# Patient Record
Sex: FEMALE | Race: WHITE | Employment: OTHER | URBAN - METROPOLITAN AREA
[De-identification: names, ages, dates, MRNs, and addresses within clinical notes are randomized per-mention and may not be internally consistent; named-entity substitution may affect disease eponyms.]

---

## 2017-03-29 PROBLEM — M17.9 OSTEOARTHRITIS OF KNEE: Status: ACTIVE | Noted: 2017-03-29

## 2017-03-29 PROBLEM — M06.9 RA (RHEUMATOID ARTHRITIS) (HCC): Status: ACTIVE | Noted: 2017-03-29

## 2017-03-29 PROBLEM — R25.2 SPASM: Status: ACTIVE | Noted: 2017-03-29

## 2017-03-29 PROBLEM — M25.539 PAIN IN WRIST: Status: ACTIVE | Noted: 2017-03-29

## 2017-12-05 PROBLEM — E66.01 OBESITY, MORBID (HCC): Status: ACTIVE | Noted: 2017-12-05

## 2020-09-29 ENCOUNTER — HOSPITAL ENCOUNTER (OUTPATIENT)
Dept: GENERAL RADIOLOGY | Age: 65
Discharge: HOME | End: 2020-09-29
Payer: COMMERCIAL

## 2020-09-29 DIAGNOSIS — G89.29 CHRONIC LEFT SHOULDER PAIN: ICD-10-CM

## 2020-09-29 DIAGNOSIS — M25.512 CHRONIC LEFT SHOULDER PAIN: ICD-10-CM

## 2020-09-29 PROCEDURE — 72050 X-RAY EXAM NECK SPINE 4/5VWS: CPT

## 2020-09-29 PROCEDURE — 73030 X-RAY EXAM OF SHOULDER: CPT

## 2022-03-18 PROBLEM — R25.2 SPASM: Status: ACTIVE | Noted: 2017-03-29

## 2022-03-19 PROBLEM — E66.01 OBESITY, MORBID (HCC): Status: ACTIVE | Noted: 2017-12-05

## 2022-03-19 PROBLEM — M17.9 OSTEOARTHRITIS OF KNEE: Status: ACTIVE | Noted: 2017-03-29

## 2022-03-19 PROBLEM — M06.9 RA (RHEUMATOID ARTHRITIS) (HCC): Status: ACTIVE | Noted: 2017-03-29

## 2022-03-19 PROBLEM — M25.539 PAIN IN WRIST: Status: ACTIVE | Noted: 2017-03-29

## 2022-05-24 ENCOUNTER — OFFICE VISIT (OUTPATIENT)
Dept: RHEUMATOLOGY | Age: 67
End: 2022-05-24
Payer: COMMERCIAL

## 2022-05-24 VITALS
WEIGHT: 217 LBS | SYSTOLIC BLOOD PRESSURE: 137 MMHG | HEIGHT: 61 IN | BODY MASS INDEX: 40.97 KG/M2 | HEART RATE: 79 BPM | DIASTOLIC BLOOD PRESSURE: 79 MMHG

## 2022-05-24 DIAGNOSIS — M06.09 RHEUMATOID ARTHRITIS, SERONEGATIVE, MULTIPLE SITES (HCC): Primary | ICD-10-CM

## 2022-05-24 DIAGNOSIS — Z79.899 LONG-TERM USE OF HIGH-RISK MEDICATION: ICD-10-CM

## 2022-05-24 LAB
ALBUMIN SERPL-MCNC: 4.2 G/DL (ref 3.2–4.6)
ALBUMIN/GLOB SERPL: 1.3 {RATIO} (ref 1.2–3.5)
ALP SERPL-CCNC: 76 U/L (ref 50–136)
ALT SERPL-CCNC: 31 U/L (ref 12–65)
ANION GAP SERPL CALC-SCNC: 5 MMOL/L (ref 7–16)
AST SERPL-CCNC: 14 U/L (ref 15–37)
BASOPHILS # BLD: 0.1 K/UL (ref 0–0.2)
BASOPHILS NFR BLD: 1 % (ref 0–2)
BILIRUB SERPL-MCNC: 0.3 MG/DL (ref 0.2–1.1)
BUN SERPL-MCNC: 14 MG/DL (ref 8–23)
CALCIUM SERPL-MCNC: 9.8 MG/DL (ref 8.3–10.4)
CHLORIDE SERPL-SCNC: 105 MMOL/L (ref 98–107)
CO2 SERPL-SCNC: 29 MMOL/L (ref 21–32)
CREAT SERPL-MCNC: 0.6 MG/DL (ref 0.6–1)
CRP SERPL-MCNC: <0.3 MG/DL (ref 0–0.9)
DIFFERENTIAL METHOD BLD: ABNORMAL
EOSINOPHIL # BLD: 0.6 K/UL (ref 0–0.8)
EOSINOPHIL NFR BLD: 10 % (ref 0.5–7.8)
ERYTHROCYTE [DISTWIDTH] IN BLOOD BY AUTOMATED COUNT: 13 % (ref 11.9–14.6)
GLOBULIN SER CALC-MCNC: 3.2 G/DL (ref 2.3–3.5)
GLUCOSE SERPL-MCNC: 97 MG/DL (ref 65–100)
HCT VFR BLD AUTO: 38.8 % (ref 35.8–46.3)
HGB BLD-MCNC: 12.5 G/DL (ref 11.7–15.4)
IMM GRANULOCYTES # BLD AUTO: 0 K/UL (ref 0–0.5)
IMM GRANULOCYTES NFR BLD AUTO: 0 % (ref 0–5)
LYMPHOCYTES # BLD: 1.8 K/UL (ref 0.5–4.6)
LYMPHOCYTES NFR BLD: 30 % (ref 13–44)
MCH RBC QN AUTO: 30 PG (ref 26.1–32.9)
MCHC RBC AUTO-ENTMCNC: 32.2 G/DL (ref 31.4–35)
MCV RBC AUTO: 93.3 FL (ref 79.6–97.8)
MONOCYTES # BLD: 0.7 K/UL (ref 0.1–1.3)
MONOCYTES NFR BLD: 12 % (ref 4–12)
NEUTS SEG # BLD: 2.8 K/UL (ref 1.7–8.2)
NEUTS SEG NFR BLD: 47 % (ref 43–78)
NRBC # BLD: 0 K/UL (ref 0–0.2)
PLATELET # BLD AUTO: 225 K/UL (ref 150–450)
PMV BLD AUTO: 10.4 FL (ref 9.4–12.3)
POTASSIUM SERPL-SCNC: 4 MMOL/L (ref 3.5–5.1)
PROT SERPL-MCNC: 7.4 G/DL (ref 6.3–8.2)
RBC # BLD AUTO: 4.16 M/UL (ref 4.05–5.2)
SODIUM SERPL-SCNC: 139 MMOL/L (ref 136–145)
WBC # BLD AUTO: 6 K/UL (ref 4.3–11.1)

## 2022-05-24 PROCEDURE — 99214 OFFICE O/P EST MOD 30 MIN: CPT | Performed by: INTERNAL MEDICINE

## 2022-05-24 PROCEDURE — 1123F ACP DISCUSS/DSCN MKR DOCD: CPT | Performed by: INTERNAL MEDICINE

## 2022-05-24 RX ORDER — ACETAMINOPHEN 500 MG
500 TABLET ORAL EVERY 6 HOURS PRN
COMMUNITY

## 2022-05-24 ASSESSMENT — PATIENT HEALTH QUESTIONNAIRE - PHQ9
1. LITTLE INTEREST OR PLEASURE IN DOING THINGS: 0
SUM OF ALL RESPONSES TO PHQ QUESTIONS 1-9: 0
2. FEELING DOWN, DEPRESSED OR HOPELESS: 0
SUM OF ALL RESPONSES TO PHQ QUESTIONS 1-9: 0
SUM OF ALL RESPONSES TO PHQ9 QUESTIONS 1 & 2: 0

## 2022-05-24 ASSESSMENT — JOINT PAIN
TOTAL NUMBER OF TENDER JOINTS: 12
TOTAL NUMBER OF SWOLLEN JOINTS: 2

## 2022-05-24 NOTE — PROGRESS NOTES
Mallorie Wall M.D.  [address] [place] [address], [address] [facility]  Office : [phone], Fax: [phone] OFFICE VISIT NOTE  Date of Visit:  2022 2:16 PM    Patient Information:  Name:  Raul Wagoner  :  1955  Age:  79 y.o. Gender:  female      Ms. Dedra Whitlock is here today for follow-up of RA. Last visit: 22      History of Present Illness: On talking to the patient she states that she has not had a cough with no congestion, fever or chills since she takes Chlortrimeton as needed to help with her allergies. She is scheduled to have a right total knee joint replacement surgery by Dr. Diana Alejandro on . Her other current joint complaints are as mentioned below. Since the last visit, patient is feeling \"very good \". Pain: 5/10  Location:  Bilateral knee pain worse in the right knee than the left knee. Some right knee swelling and warmth but no overlying redness. No buckling of the right knee. Bilateral hand stiffness involving the PIP joints with swelling with no overlying warmth and redness. Bilateral thumb pain worse in the left thumb than the right thumb involving the IP joint only in the left thumb and the CMC and IP of the right thumb. She does complain of some neck stiffness with occasional pain with neck ROM. She denies any headaches. Quality: Sharp pain in the knees and left thumb and achy pain in the hands. Modifying Factors:  End of the day the pain and stiffness is the worst.   Associated Symptoms:  Has a good  with no difficulty opening jars with no difficulty buttoning and unbuttoning. No tingling, numbness or pain down the arms or legs.     Last TB screen: 21  TB result: Negative     Current dose of steroids: NA  How long on current dose of steroids: NA  How long on continuous steroid therapy: NA      Past DMARDs, if applicable (methotrexate, plaquenil/hydroxychloroquine, sulfasalazine, Arava/leflunomide): Was on Plaquenil and methotrexate weekly in the past.      Past biologics, if applicable (enbrel, humira, simponi, cimzia, xeljanz, orencia, remicade, simponi aria, actemra, rituximab, Stann Yip, stelara, cosentyx): Currently on Humira 40 mg subcu 1 shot administered to self every 2 weeks (every other Thursday).        Past NSAIDs, if applicable (motrin, aleve, naproxen, advil, ibuprofen, celebrex, voltaren/diclofenac, etc.): Motrin/Ibuprofen, Advil, Mobic, Relafen in the past. Taking Aleve 200mg prn.       Last BMD: 2018  Past osteoporosis drugs, if applicable (fosamax, actonel, boniva, reclast, prolia, forteo): None    BMI: 41.00  Current exercise regimen, if any: none  Current vitamin D dose: D3 5,000 iu qd  Current calcium dose: None  Fractures since last visit, if any: None    The patient otherwise has no significant interval changes in health or medical history to report.      History Reviewed:    Past Medical History  Past Medical History:   Diagnosis Date    Knee pain     Osteoarthritis of knee 3/29/2017    Pain in wrist 3/29/2017    RA (rheumatoid arthritis) ([facility] [address] 3/29/2017    Spasm 3/29/2017       Past Surgical History  Past Surgical History:   Procedure Laterality Date    CHOLECYSTECTOMY      COLONOSCOPY      2017    ORTHOPEDIC SURGERY  2011    Knee surgery    OTHER SURGICAL HISTORY Right     Thumb    TONSILLECTOMY  1973       Family History  Family History   Problem Relation Age of Onset    Hypertension Brother     Heart Disease Father     Heart Disease Mother        Social History  Social History     Socioeconomic History    Marital status: Single     Spouse name: None    Number of children: None    Years of education: None    Highest education level: None   Occupational History    None   Tobacco Use    Smoking status: Former Smoker     Packs/day: 0.50     Quit date: 1985     Years since quittin.4    Smokeless tobacco: Never Used   Substance and Sexual Activity    Alcohol use: No    Drug use: Not Currently     Types: Prescription, Opiates     Sexual activity: None   Other Topics Concern    None   Social History Narrative    None     Social Determinants of Health     Financial Resource Strain:     Difficulty of Paying Living Expenses: Not on file   Food Insecurity:     Worried About Running Out of Food in the Last Year: Not on file    Renato of Food in the Last Year: Not on file   Transportation Needs:     Lack of Transportation (Medical): Not on file    Lack of Transportation (Non-Medical):  Not on file   Physical Activity:     Days of Exercise per Week: Not on file    Minutes of Exercise per Session: Not on file   Stress:     Feeling of Stress : Not on file   Social Connections:     Frequency of Communication with Friends and Family: Not on file    Frequency of Social Gatherings with Friends and Family: Not on file    Attends Buddhism Services: Not on file    Active Member of [address] [place] [address] or Organizations: Not on file    Attends Club or Organization Meetings: Not on file    Marital Status: Not on file   Intimate Partner Violence:     Fear of Current or Ex-Partner: Not on file    Emotionally Abused: Not on file    Physically Abused: Not on file    Sexually Abused: Not on file   Housing Stability:     Unable to Pay for Housing in the Last Year: Not on file    Number of [place] in the Last Year: Not on file    Unstable Housing in the Last Year: Not on file               Allergy:  Allergies   Allergen Reactions    Penicillins Shortness Of Breath    Hydroxyzine Hcl Rash    Sulfa Antibiotics Rash         Current Medications:  Outpatient Encounter Medications as of 5/24/2022   Medication Sig Dispense Refill    acetaminophen (TYLENOL) 500 MG tablet Take 500 mg by mouth every 6 hours as needed for Pain 1-2 times daily prn      adalimumab (HUMIRA PEN) 40 MG/0.8ML injection Inject 40 mg into the skin every 14 days INJECT 40 MG (0.8 ML) UNDER THE SKIN EVERY 2 WEEKS 6 each 0    vitamin D3 (CHOLECALCIFEROL) 125 MCG (5000 UT) TABS tablet Take by mouth daily      cyclobenzaprine (FLEXERIL) 10 MG tablet Take 10 mg by mouth 3 times daily as needed      folic acid (FOLVITE) 1 MG tablet Take 1 mg by mouth daily      furosemide (LASIX) 40 MG tablet Take by mouth daily      levothyroxine (SYNTHROID) 200 MCG tablet Take by mouth every morning (before breakfast)      Naproxen Sodium 220 MG CAPS Take by mouth      telmisartan (MICARDIS) 80 MG tablet Take 80 mg by mouth daily      predniSONE (DELTASONE) 20 MG tablet Take 1 pill once a day after breakfast for 1 week and then 1/2 a pill once a day after breakfast for 1 week and then stop. (Patient not taking: Reported on 5/24/2022)      [DISCONTINUED] adalimumab (HUMIRA PEN) 40 MG/0.8ML injection INJECT 40 MG (0.8 ML) UNDER THE SKIN EVERY 2 WEEKS       No facility-administered encounter medications on file as of 5/24/2022.            REVIEW OF SYSTEMS: The following systems were reviewed with patient today and were negative except for the following (depicted with an \"X\"):        \"X\" General  \"X\" Head and Neck  \"X\" Heart and Breathing  \"X\" Gastrointestinal    Fever/chills  x Hair loss   Shortness of breath   Upset stomach    Falls   Dry mouth   Coughing   Diarrhea / constipation    Wt loss   Mouth sores   Wheezing   Heartburn    Wt gain   Ringing ears   Chest pain   Dark or bloody stools    Night sweats   Diff. swallowing  X None of above   Nausea or vomiting   X None of above   None of above     X None of above                \"X\" Skin  \"X\" Neurology  \"X\" Urinary/Gyn  \"X\" Other   x Easy bruising   Numbness/ tingling   Female problems   Depression    Rashes   Weakness   Problems with urination   Feeling anxious    Sun sensitivity   Headaches  X None of above   Problems sleeping    None of above  X None of above     X None of above          Physical Exam:  Blood pressure 137/79, pulse 79, height 5' 1\" (1.549 m), weight 217 lb (98.4 kg). General:  Patient alert, cooperative and in no apparent distress. HEENT: Pupils equally reactive to light and accommodation, minimal scleral injection noted. Heart: Regular rate and rhythm, normal S1 and S2, no rubs or gallops. Lungs: Clear to auscultation bilaterally. Abdomen: Soft, nontender, no hepatosplenomegaly. Skin:  Noted rash on bilateral lower extremities involving the area of the shin. No nail abnormalities. Neurologic:  Oriented, normal speech and affect. Normal gait. Extremities:  No edema in bilateral lower extremities with no cyanosis or clubbing. Muskoskeletal Exam:     I examined the shoulders, elbows, wrists, MCPs, PIPs, DIPs and knees bilaterally for strength, range of motion, deformity, tenderness, swelling, and synovitis. The findings are:         Physical Exam              Joint Exam 05/24/2022        Right  Left   IP   Tender   Tender   PIP 2   Tender   Tender   PIP 3   Tender   Tender   PIP 4   Tender   Tender   PIP 5   Tender   Tender   Knee  Swollen Tender  Swollen Tender       cJADAS 10: 3.62. Patient otherwise has a normal joint exam without other evidence of joint tenderness, synovitis, warmth, erythema, decreased ROM, weakness or deformities. Radiology Reports Reviewed (if available):  Last 3 months  [unfilled]    Lab Reports Reviewed (if available): Last 3 months    No visits with results within 3 Month(s) from this visit.    Latest known visit with results is:   Office Visit on 08/23/2021   Component Date Value Ref Range Status    CRP 08/23/2021 1  0 - 10 mg/L Final    Glucose 08/23/2021 104* 65 - 99 mg/dL Final    BUN 08/23/2021 12  8 - 27 mg/dL Final    CREATININE 08/23/2021 0.51* 0.57 - 1.00 mg/dL Final    EGFR IF NonAfrican American 08/23/2021 101  >59 mL/min/1.73 Final    GFR  08/23/2021 116  >59 mL/min/1.73 Final    Comment: **[facility] currently reports eGFR in compliance with the current**    recommendations of the [employer]. Brian Hogue will    update reporting as new guidelines are published from the NKF-ASN    Task force.       Bun/Cre Ratio 08/23/2021 24  12 - 28 NA Final    Sodium 08/23/2021 141  134 - 144 mmol/L Final    Potassium 08/23/2021 3.8  3.5 - 5.2 mmol/L Final    Chloride 08/23/2021 103  96 - 106 mmol/L Final    CO2 08/23/2021 26  20 - 29 mmol/L Final    Calcium 08/23/2021 9.9  8.7 - 10.3 mg/dL Final    Total Protein 08/23/2021 6.9  6.0 - 8.5 g/dL Final    Albumin 08/23/2021 4.3  3.8 - 4.8 g/dL Final    Globulin, Total 08/23/2021 2.6  1.5 - 4.5 g/dL Final    Albumin/Globulin Ratio 08/23/2021 1.7  1.2 - 2.2 NA Final    Total Bilirubin 08/23/2021 0.3  0.0 - 1.2 mg/dL Final    Alkaline Phosphatase 08/23/2021 75  48 - 121 IU/L Final    AST 08/23/2021 13  0 - 40 IU/L Final    ALT 08/23/2021 13  0 - 32 IU/L Final    WBC 08/23/2021 7.8  3.4 - 10.8 x10E3/uL Final    RBC 08/23/2021 4.31  3.77 - 5.28 x10E6/uL Final    Hemoglobin 08/23/2021 13.2  11.1 - 15.9 g/dL Final    Hematocrit 08/23/2021 38.8  34.0 - 46.6 % Final    MCV 08/23/2021 90  79 - 97 fL Final    MCH 08/23/2021 30.6  26.6 - 33.0 pg Final    MCHC 08/23/2021 34.0  31.5 - 35.7 g/dL Final    RDW 08/23/2021 11.8  11.7 - 15.4 % Final    Platelets 66/20/0771 235  150 - 450 x10E3/uL Final    Neutrophils % 08/23/2021 54  Not Estab. % Final    Lymphocytes % 08/23/2021 29  Not Estab. % Final    Monocytes % 08/23/2021 7  Not Estab. % Final    Eosinophils % 08/23/2021 9  Not Estab. % Final    Basophils % 08/23/2021 1  Not Estab. % Final    Neutrophils Absolute 08/23/2021 4.2  1.4 - 7.0 x10E3/uL Final    Lymphocytes Absolute 08/23/2021 2.3  0.7 - 3.1 x10E3/uL Final    Monocytes Absolute 08/23/2021 0.5  0.1 - 0.9 x10E3/uL Final    Eosinophils Absolute 08/23/2021 0.7* 0.0 - 0.4 x10E3/uL Final    Basophils Absolute 08/23/2021 0.1  0.0 - 0.2 x10E3/uL Final    Immature Granulocytes 08/23/2021 0  Not Estab. % Final    GRANULOCYTE ABSOLUTE COUNT 08/23/2021 0.0  0.0 - 0.1 x10E3/uL Final         The results above were reviewed and discussed with patient. Assessment/Plan: Vielka Church is a 79 y.o. female who presents with:     1. Rheumatoid arthritis, seronegative, multiple sites [facility]): She was instructed to continue Humira 40 mg 1 shot to be administered to self every 2 weeks. Patient is aware that if she is sick requiring her to an antibiotic or antiviral drug she will need to skip administering the Humira until she has completed the antibiotic/antiviral course and is over the infection. -     adalimumab (HUMIRA PEN) 40 MG/0.8ML injection; Inject 40 mg into the skin every 14 days INJECT 40 MG (0.8 ML) UNDER THE SKIN EVERY 2 WEEKS  -     C-Reactive Protein; Future  -     C-Reactive Protein    2. Long-term use of high-risk medication: If there is any noted abnormality I will keep the patient informed but if not I will review her labs with her on follow-up. -     Comprehensive Metabolic Panel; Future  -     CBC with Auto Differential; Future    Disease activity plan:  As stated above. Steroid management plan:  As stated above, if applicable. Pain management plan:  As stated above, if applicable. Weight management plan:  Weight loss through diet and exercise is always encouraged    Disease prognosis: Good        I appreciate the opportunity to continue to participate in the care of this patient. Follow-up and Dispositions    · Return in about 4 months (around 9/24/2022). Electronically signed by:  Criselda Wright MD      This note was dictated using dragon voice recognition software.   It has been proofread, but there may still exist voice recognition errors that the author did not detect.                --------------------------------------------------------------------------------------------------------------------------------------------------------------------------------------------------------------------------------

## 2022-09-22 ENCOUNTER — OFFICE VISIT (OUTPATIENT)
Dept: RHEUMATOLOGY | Age: 67
End: 2022-09-22
Payer: COMMERCIAL

## 2022-09-22 VITALS — WEIGHT: 216 LBS | BODY MASS INDEX: 40.78 KG/M2 | HEIGHT: 61 IN

## 2022-09-22 DIAGNOSIS — M62.838 MUSCLE SPASM: ICD-10-CM

## 2022-09-22 DIAGNOSIS — M06.09 RHEUMATOID ARTHRITIS, SERONEGATIVE, MULTIPLE SITES (HCC): Primary | ICD-10-CM

## 2022-09-22 DIAGNOSIS — Z23 NEED FOR INFLUENZA VACCINATION: ICD-10-CM

## 2022-09-22 PROCEDURE — 1123F ACP DISCUSS/DSCN MKR DOCD: CPT | Performed by: INTERNAL MEDICINE

## 2022-09-22 PROCEDURE — 90471 IMMUNIZATION ADMIN: CPT | Performed by: INTERNAL MEDICINE

## 2022-09-22 PROCEDURE — 90694 VACC AIIV4 NO PRSRV 0.5ML IM: CPT | Performed by: INTERNAL MEDICINE

## 2022-09-22 PROCEDURE — 99214 OFFICE O/P EST MOD 30 MIN: CPT | Performed by: INTERNAL MEDICINE

## 2022-09-22 RX ORDER — CYCLOBENZAPRINE HCL 10 MG
TABLET ORAL
Qty: 270 TABLET | Refills: 1 | Status: SHIPPED | OUTPATIENT
Start: 2022-09-22

## 2022-09-22 ASSESSMENT — JOINT PAIN
TOTAL NUMBER OF SWOLLEN JOINTS: 2
TOTAL NUMBER OF TENDER JOINTS: 4

## 2022-09-22 ASSESSMENT — ROUTINE ASSESSMENT OF PATIENT INDEX DATA (RAPID3)
ON A SCALE OF ONE TO TEN, CONSIDERING ALL THE WAYS IN WHICH ILLNESS AND HEALTH CONDITIONS MAY AFFECT YOU AT THIS TIME, PLEASE INDICATE BELOW HOW YOU ARE DOING:: 1
ON A SCALE OF ONE TO TEN, HOW MUCH PAIN HAVE YOU HAD BECAUSE OF YOUR CONDITION OVER THE PAST WEEK?: 7
ON A SCALE OF ONE TO TEN, HOW MUCH OF A PROBLEM HAS UNUSUAL FATIGUE OR TIREDNESS BEEN FOR YOU OVER THE PAST WEEK?: 4
WHEN YOU AWAKENED IN THE MORNING OVER THE LAST WEEK, PLEASE INDICATE THE AMOUNT OF TIME IT TAKES UNTIL YOU ARE AS LIMBER AS YOU WILL BE FOR THE DAY: > 1 HOUR
ON A SCALE OF ONE TO TEN, HOW DIFFICULT WAS IT FOR YOU TO COMPLETE THE LISTED DAILY PHYSICAL TASKS OVER THE LAST WEEK: 0.7

## 2022-09-22 NOTE — PROGRESS NOTES
Andre Menon M.D.  [address] [place] [address], [address] [facility]   Office : [phone], Fax: [phone] OFFICE VISIT NOTE  Date of Visit:  2022 6:42 PM    Patient Information:  Name:  Vero Fernandes  :  1955  Age:  79 y.o. Gender:  female      Ms. Peter Julian is here today for follow-up of RA. Last visit: 22      History of Present Illness: On talking to the patient today she states that she has received the flu vaccine for this year and hence the flu vaccine was administered to the patient today. She is supposed to have pre-op labs done this month as she is scheduled to have a total right knee joint replacement on . Her other current joint complaints are as mentioned below. Since the last visit, patient is feeling \"good\". Pain: 7/10  Location:  Some right knee pain with swelling worse than the left knee pain and swelling. Occasional buckling of the right knee worse than the left knee. Bilateral thumb pain with swelling but no warmth and redness. Some stiffness in the PIP joints. Quality:  Deep achy to sharp pain. Modifying Factors: The patellar strap helps. Associated Symptoms:  No tingling, numbness or pain down the arms or legs. No UE or LE weakness. Has a weak  with no limitations with her ADL's. DMARD/Biologic 2022   AM Stiffness > 1 hour   Pain 7   Fatigue 4   MDHAQ 0.7   Patient Global Score 1   Medication Name Humira   Medication Name Other   Other Medication flexeril     Last TB screen: 21- having TB skin test done for surgery in the   next 2 weeks  TB result: Testing done 2021 was negative     Current dose of steroids: NA  How long on current dose of steroids: NA  How long on continuous steroid therapy: NA      Past DMARDs, if applicable (methotrexate, plaquenil/hydroxychloroquine, sulfasalazine, Arava/leflunomide):  Was on Plaquenil 200 mg twice a day and Methotrexate 7.5 mg weekly in Allergy:  Allergies   Allergen Reactions    Penicillins Shortness Of Breath    Hydroxyzine Hcl Rash    Sulfa Antibiotics Rash         Current Medications:  Outpatient Encounter Medications as of 9/22/2022   Medication Sig Dispense Refill    adalimumab (HUMIRA PEN) 40 MG/0.8ML injection Inject 40 mg into the skin every 14 days INJECT 40 MG (0.8 ML) UNDER THE SKIN EVERY 2 WEEKS 6 each 1    cyclobenzaprine (FLEXERIL) 10 MG tablet Take 1 pill three times a day as needed. 270 tablet 1    acetaminophen (TYLENOL) 500 MG tablet Take 500 mg by mouth every 6 hours as needed for Pain 1-2 times daily prn      vitamin D3 (CHOLECALCIFEROL) 125 MCG (5000 UT) TABS tablet Take by mouth daily      folic acid (FOLVITE) 1 MG tablet Take 1 mg by mouth daily      furosemide (LASIX) 40 MG tablet Take by mouth daily      levothyroxine (SYNTHROID) 200 MCG tablet Take by mouth every morning (before breakfast)      Naproxen Sodium 220 MG CAPS Take by mouth      predniSONE (DELTASONE) 20 MG tablet Take 1 pill once a day after breakfast for 1 week and then 1/2 a pill once a day after breakfast for 1 week and then stop.      telmisartan (MICARDIS) 80 MG tablet Take 80 mg by mouth daily      [DISCONTINUED] adalimumab (HUMIRA PEN) 40 MG/0.8ML injection Inject 40 mg into the skin every 14 days INJECT 40 MG (0.8 ML) UNDER THE SKIN EVERY 2 WEEKS 6 each 0    [DISCONTINUED] cyclobenzaprine (FLEXERIL) 10 MG tablet Take 10 mg by mouth 3 times daily as needed       No facility-administered encounter medications on file as of 9/22/2022.            REVIEW OF SYSTEMS: The following systems were reviewed with patient today and were negative except for the following (depicted with an \"X\"):        \"X\" General  \"X\" Head and Neck  \"X\" Heart and Breathing  \"X\" Gastrointestinal    Fever/chills  x Hair loss   Shortness of breath   Upset stomach    Falls   Dry mouth   Coughing   Diarrhea / constipation    Wt loss   Mouth sores   Wheezing   Heartburn Wt gain   Ringing ears   Chest pain   Dark or bloody stools    Night sweats   Diff. swallowing  X None of above   Nausea or vomiting   X None of above   None of above     X None of above                \"X\" Skin  \"X\" Neurology  \"X\" Urinary/Gyn  \"X\" Other    Easy bruising   Numbness/ tingling   Female problems   Depression    Rashes   Weakness   Problems with urination   Feeling anxious    Sun sensitivity   Headaches  X None of above   Problems sleeping   X None of above  X None of above     X None of above          Physical Exam:  Height 5' 1\" (1.549 m), weight 216 lb (98 kg). General:  Patient alert, cooperative and in no apparent distress. HEENT: Pupils equally reactive to light and accomodation, no scleral injection noted. Heart: Regular rate and rhythm, normal S1 and S2, audible murmur with no rubs or gallops. Lungs: Clear to auscultation bilaterally with no audible wheeze or rhonchi. Abdomen: Soft, nontender, no hepatosplenomegaly. Skin:  No rashes. No nail abnormalities. Neurologic:  Oriented, normal speech and affect. Normal gait. Extremities:  No edema in bilateral lower extremities with no cyanosis or clubbing. Muskoskeletal Exam:     I examined the shoulders, elbows, wrists, MCPs, PIPs, DIPs and knees bilaterally for strength, range of motion, deformity, tenderness, swelling, and synovitis. The findings are:       Physical Exam              Joint Exam 09/22/2022        Right  Left   MCP 1   Tender   Tender   Knee  Swollen Tender  Swollen Tender     cJADAS 10:- 2.67    Patient otherwise has a normal joint exam without other evidence of joint tenderness, synovitis, warmth, erythema, decreased ROM, weakness or deformities. Radiology Reports Reviewed (if available):  Last 3 months  [unfilled]    Lab Reports Reviewed (if available): Last 3 months    No visits with results within 3 Month(s) from this visit.    Latest known visit with results is:   Office Visit on 05/24/2022   Component Date Value Ref Range Status    WBC 05/24/2022 6.0  4.3 - 11.1 K/uL Final    RBC 05/24/2022 4.16  4.05 - 5.2 M/uL Final    Hemoglobin 05/24/2022 12.5  11.7 - 15.4 g/dL Final    Hematocrit 05/24/2022 38.8  35.8 - 46.3 % Final    MCV 05/24/2022 93.3  79.6 - 97.8 FL Final    MCH 05/24/2022 30.0  26.1 - 32.9 PG Final    MCHC 05/24/2022 32.2  31.4 - 35.0 g/dL Final    RDW 05/24/2022 13.0  11.9 - 14.6 % Final    Platelets 00/06/4820 225  150 - 450 K/uL Final    MPV 05/24/2022 10.4  9.4 - 12.3 FL Final    nRBC 05/24/2022 0.00  0.0 - 0.2 K/uL Final    **Note: Absolute NRBC parameter is now reported with Hemogram**    Differential Type 05/24/2022 AUTOMATED    Final    Seg Neutrophils 05/24/2022 47  43 - 78 % Final    Lymphocytes 05/24/2022 30  13 - 44 % Final    Monocytes 05/24/2022 12  4.0 - 12.0 % Final    Eosinophils % 05/24/2022 10 (A) 0.5 - 7.8 % Final    Basophils 05/24/2022 1  0.0 - 2.0 % Final    Immature Granulocytes 05/24/2022 0  0.0 - 5.0 % Final    Segs Absolute 05/24/2022 2.8  1.7 - 8.2 K/UL Final    Absolute Lymph # 05/24/2022 1.8  0.5 - 4.6 K/UL Final    Absolute Mono # 05/24/2022 0.7  0.1 - 1.3 K/UL Final    Absolute Eos # 05/24/2022 0.6  0.0 - 0.8 K/UL Final    Basophils Absolute 05/24/2022 0.1  0.0 - 0.2 K/UL Final    Absolute Immature Granulocyte 05/24/2022 0.0  0.0 - 0.5 K/UL Final    CRP 05/24/2022 <0.3  0.0 - 0.9 mg/dL Final    Sodium 05/24/2022 139  136 - 145 mmol/L Final    Potassium 05/24/2022 4.0  3.5 - 5.1 mmol/L Final    Chloride 05/24/2022 105  98 - 107 mmol/L Final    CO2 05/24/2022 29  21 - 32 mmol/L Final    Anion Gap 05/24/2022 5 (A) 7 - 16 mmol/L Final    Glucose 05/24/2022 97  65 - 100 mg/dL Final    BUN 05/24/2022 14  8 - 23 MG/DL Final    Creatinine 05/24/2022 0.60  0.6 - 1.0 MG/DL Final    GFR  05/24/2022 >60  >60 ml/min/1.73m2 Final    GFR Non- 05/24/2022 >60  >60 ml/min/1.73m2 Final    Comment:   Estimated GFR is calculated using the Modification of Diet in Renal Disease (MDRD) Study equation, reported for both  Americans (GFRAA) and non- Americans (GFRNA), and normalized to 1.73m2 body surface area. The physician must decide which value applies to the patient. The MDRD study equation should only be used in individuals age 25 or older. It has not been validated for the following: pregnant women, patients with serious comorbid conditions,or on certain medications, or persons with extremes of body size, muscle mass, or nutritional status. Calcium 05/24/2022 9.8  8.3 - 10.4 MG/DL Final    Total Bilirubin 05/24/2022 0.3  0.2 - 1.1 MG/DL Final    ALT 05/24/2022 31  12 - 65 U/L Final    AST 05/24/2022 14 (A) 15 - 37 U/L Final    Alk Phosphatase 05/24/2022 76  50 - 136 U/L Final    Total Protein 05/24/2022 7.4  6.3 - 8.2 g/dL Final    Albumin 05/24/2022 4.2  3.2 - 4.6 g/dL Final    Globulin 05/24/2022 3.2  2.3 - 3.5 g/dL Final    Albumin/Globulin Ratio 05/24/2022 1.3  1.2 - 3.5   Final         The results above were reviewed and discussed with patient. Assessment/Plan: Bret Hawkins is a 79 y.o. female who presents with:     Rheumatoid arthritis, seronegative, multiple sites [facility]): Patient was instructed to continue Humira 40 mg 1 shot to be administered to self every 2 weeks. She is aware that if she is sick requiring her to be on an antibiotic or antiviral drug she will need to skip administering the Humira until she has completed the antibiotic/antiviral course and is over the infection. -     adalimumab (HUMIRA PEN) 40 MG/0.8ML injection; Inject 40 mg into the skin every 14 days INJECT 40 MG (0.8 ML) UNDER THE SKIN EVERY 2 WEEKS    Muscle spasm: Patient was instructed to continue Flexeril 10 mg 1 pill to be taken 3 times a day as needed for muscle spasms. -     cyclobenzaprine (FLEXERIL) 10 MG tablet; Take 1 pill three times a day as needed. Need for influenza vaccination:  The flu vaccine was administered to the patient today by my MA. She did tolerate the vaccine with no adverse side effects. -     Influenza, FLUAD, (age 72 y+), IM, PF, 0.5 mL     Disease activity plan:  As stated above. Steroid management plan:  As stated above, if applicable. Pain management plan:  As stated above, if applicable. Weight management plan:  Weight loss through diet and exercise is always encouraged    Disease prognosis: Good    I appreciate the opportunity to continue to participate in the care of this patient. Follow-up and Dispositions    Return in about 4 months (around 1/22/2023). Electronically signed by:  Yamil Donaldson MD      This note was dictated using dragon voice recognition software.   It has been proofread, but there may still exist voice recognition errors that the author did not detect.                --------------------------------------------------------------------------------------------------------------------------------------------------------------------------------------------------------------------------------

## 2023-01-30 ENCOUNTER — OFFICE VISIT (OUTPATIENT)
Dept: RHEUMATOLOGY | Age: 68
End: 2023-01-30
Payer: COMMERCIAL

## 2023-01-30 VITALS
HEART RATE: 81 BPM | DIASTOLIC BLOOD PRESSURE: 78 MMHG | SYSTOLIC BLOOD PRESSURE: 137 MMHG | BODY MASS INDEX: 39.11 KG/M2 | WEIGHT: 207 LBS

## 2023-01-30 DIAGNOSIS — Z79.899 LONG-TERM USE OF HIGH-RISK MEDICATION: ICD-10-CM

## 2023-01-30 DIAGNOSIS — M06.09 RHEUMATOID ARTHRITIS, SERONEGATIVE, MULTIPLE SITES (HCC): ICD-10-CM

## 2023-01-30 DIAGNOSIS — M62.838 MUSCLE SPASM: ICD-10-CM

## 2023-01-30 DIAGNOSIS — N95.1 POSTMENOPAUSAL DISORDER: ICD-10-CM

## 2023-01-30 DIAGNOSIS — Z11.1 SCREENING EXAMINATION FOR PULMONARY TUBERCULOSIS: ICD-10-CM

## 2023-01-30 DIAGNOSIS — M06.09 RHEUMATOID ARTHRITIS, SERONEGATIVE, MULTIPLE SITES (HCC): Primary | ICD-10-CM

## 2023-01-30 LAB
BASOPHILS # BLD: 0.1 K/UL (ref 0–0.2)
BASOPHILS NFR BLD: 1 % (ref 0–2)
DIFFERENTIAL METHOD BLD: NORMAL
EOSINOPHIL # BLD: 0.4 K/UL (ref 0–0.8)
EOSINOPHIL NFR BLD: 5 % (ref 0.5–7.8)
ERYTHROCYTE [DISTWIDTH] IN BLOOD BY AUTOMATED COUNT: 12.3 % (ref 11.9–14.6)
HCT VFR BLD AUTO: 41.4 % (ref 35.8–46.3)
HGB BLD-MCNC: 13.4 G/DL (ref 11.7–15.4)
IMM GRANULOCYTES # BLD AUTO: 0 K/UL (ref 0–0.5)
IMM GRANULOCYTES NFR BLD AUTO: 0 % (ref 0–5)
LYMPHOCYTES # BLD: 2.5 K/UL (ref 0.5–4.6)
LYMPHOCYTES NFR BLD: 29 % (ref 13–44)
MCH RBC QN AUTO: 30 PG (ref 26.1–32.9)
MCHC RBC AUTO-ENTMCNC: 32.4 G/DL (ref 31.4–35)
MCV RBC AUTO: 92.6 FL (ref 82–102)
MONOCYTES # BLD: 0.6 K/UL (ref 0.1–1.3)
MONOCYTES NFR BLD: 7 % (ref 4–12)
NEUTS SEG # BLD: 5.1 K/UL (ref 1.7–8.2)
NEUTS SEG NFR BLD: 58 % (ref 43–78)
NRBC # BLD: 0 K/UL (ref 0–0.2)
PLATELET # BLD AUTO: 262 K/UL (ref 150–450)
PMV BLD AUTO: 10.4 FL (ref 9.4–12.3)
RBC # BLD AUTO: 4.47 M/UL (ref 4.05–5.2)
WBC # BLD AUTO: 8.7 K/UL (ref 4.3–11.1)

## 2023-01-30 PROCEDURE — 1123F ACP DISCUSS/DSCN MKR DOCD: CPT | Performed by: INTERNAL MEDICINE

## 2023-01-30 PROCEDURE — 99214 OFFICE O/P EST MOD 30 MIN: CPT | Performed by: INTERNAL MEDICINE

## 2023-01-30 RX ORDER — ROSUVASTATIN CALCIUM 20 MG/1
20 TABLET, COATED ORAL DAILY
COMMUNITY
Start: 2022-10-04 | End: 2023-10-04

## 2023-01-30 RX ORDER — CYCLOBENZAPRINE HCL 10 MG
TABLET ORAL
Qty: 270 TABLET | Refills: 1 | Status: SHIPPED | OUTPATIENT
Start: 2023-01-30

## 2023-01-30 ASSESSMENT — ROUTINE ASSESSMENT OF PATIENT INDEX DATA (RAPID3)
ON A SCALE OF ONE TO TEN, HOW MUCH PAIN HAVE YOU HAD BECAUSE OF YOUR CONDITION OVER THE PAST WEEK?: 3
ON A SCALE OF ONE TO TEN, HOW MUCH OF A PROBLEM HAS UNUSUAL FATIGUE OR TIREDNESS BEEN FOR YOU OVER THE PAST WEEK?: 3
ON A SCALE OF ONE TO TEN, HOW DIFFICULT WAS IT FOR YOU TO COMPLETE THE LISTED DAILY PHYSICAL TASKS OVER THE LAST WEEK: 0.8
ON A SCALE OF ONE TO TEN, CONSIDERING ALL THE WAYS IN WHICH ILLNESS AND HEALTH CONDITIONS MAY AFFECT YOU AT THIS TIME, PLEASE INDICATE BELOW HOW YOU ARE DOING:: 4
WHEN YOU AWAKENED IN THE MORNING OVER THE LAST WEEK, PLEASE INDICATE THE AMOUNT OF TIME IT TAKES UNTIL YOU ARE AS LIMBER AS YOU WILL BE FOR THE DAY: 1 HOUR

## 2023-01-30 ASSESSMENT — JOINT PAIN
TOTAL NUMBER OF SWOLLEN JOINTS: 1
TOTAL NUMBER OF TENDER JOINTS: 12

## 2023-01-30 NOTE — PROGRESS NOTES
Thierry Madrid M.D.  [address]ie[address] [facility],  Daisy Randhawa  Office : [phone], Fax: [phone] OFFICE VISIT NOTE  Date of Visit:  2023 3:35 PM    Patient Information:  Name:  Omar Neely  :  1955  Age:  76 y.o. Gender:  female      Ms. Kellie Baker is here today for follow-up of RA. Last visit: 2022      History of Present Illness: On talking to the patient today she states that she has not had any cough, congestion, fever or chills secondary to allergy related problems. She did have to skip administering 1 dose of the Humira when she went in to have her right total knee joint replacement, but states that she did not have a flare of her underlying joint condition though. Her current joint complaints are as mentioned below. Since the last visit, patient is feeling \"very good\". Pain: 3/10  Location:  Some left knee pain worse than the right knee pain. Some swelling of the knees with some warmth but some redness of the left knee. Occasional buckling of the left knee. Some right thumb pain with no swelling, warmth and redness. Some pain and swelling of the MCP and PIP joints with no warmth and redness. Quality:  Sore feeling in the hands. Sharp pain in the knees. Modifying Factors:  1st thing in the morning the stiffness is the worst and the pain is the worst at the end of the day. Associated Symptoms:  No tingling, numbness or pain down the arms or legs with intermittent tingling and numbness of the fingertips.     DMARD/Biologic 2023   AM Stiffness 1 hour   Pain 3   Fatigue 3   MDHAQ 0.8   Patient Global Score 4   Medication Name Humira   Medication Name -   Other Medication -     Last TB screen: 21  TB result: Negative     Current dose of steroids: NA  How long on current dose of steroids: NA  How long on continuous steroid therapy: NA      Past DMARDs, if applicable (methotrexate, plaquenil/hydroxychloroquine, sulfasalazine, Arava/leflunomide): Was on Plaquenil 200 mg twice a day and Methotrexate 7.5 mg weekly in the past but was ineffective after a while. Past biologics, if applicable (enbrel, humira, simponi, cimzia, Alroy Din, SANZ, remicade, simponi Marzetta August, actemra, rituximab, Lafonda Hina, stelara, cosentyx): Currently on Humira 40 mg subcu 1 shot to be administered to self every 2 weeks (every other Thursday). Past NSAIDs, if applicable (motrin, aleve, naproxen, advil, ibuprofen, celebrex, voltaren/diclofenac, etc.): Motrin/Ibuprofen, Advil, Mobic, Relafen in the past. Taking Aleve 200mg prn. Last BMD: 8/2018  Past osteoporosis drugs, if applicable (fosamax, actonel, boniva, reclast, prolia, forteo): None     BMI:39.11  Current exercise regimen, if any: none  Current vitamin D dose: D3 5,000 iu qd  Current calcium dose: None  Fractures since last visit, if any: None    The patient otherwise has no significant interval changes in health or medical history to report.      History Reviewed:    Past Medical History  Past Medical History:   Diagnosis Date    Knee pain     Osteoarthritis of knee 3/29/2017    Pain in wrist 3/29/2017    RA (rheumatoid arthritis) ([facility] [address] 3/29/2017    Spasm 3/29/2017       Past Surgical History  Past Surgical History:   Procedure Laterality Date    CHOLECYSTECTOMY  2000    COLONOSCOPY      11/7/2017    ORTHOPEDIC SURGERY  2011    Knee surgery    OTHER SURGICAL HISTORY Right     Thumb    TONSILLECTOMY  1973       Family History  Family History   Problem Relation Age of Onset    Hypertension Brother     Heart Disease Father     Heart Disease Mother        Social History  Social History     Socioeconomic History    Marital status: Single     Spouse name: None    Number of children: None    Years of education: None    Highest education level: None   Tobacco Use    Smoking status: Former     Packs/day: 0.50     Types: Cigarettes     Quit date: 1/1/1985 Years since quittin.1    Smokeless tobacco: Never   Substance and Sexual Activity    Alcohol use: No    Drug use: Not Currently     Types: Prescription, Opiates                Allergy:  Allergies   Allergen Reactions    Penicillins Shortness Of Breath    Hydroxyzine Hcl Rash    Sulfa Antibiotics Rash         Current Medications:  Outpatient Encounter Medications as of 2023   Medication Sig Dispense Refill    rosuvastatin (CRESTOR) 20 MG tablet Take 20 mg by mouth daily      adalimumab (HUMIRA PEN) 40 MG/0.8ML injection Inject 40 mg into the skin every 14 days INJECT 40 MG (0.8 ML) UNDER THE SKIN EVERY 2 WEEKS 6 each 1    cyclobenzaprine (FLEXERIL) 10 MG tablet Take 1 pill three times a day as needed. 270 tablet 1    vitamin D3 (CHOLECALCIFEROL) 125 MCG (5000 UT) TABS tablet Take by mouth daily      folic acid (FOLVITE) 1 MG tablet Take 1 mg by mouth daily      furosemide (LASIX) 40 MG tablet Take by mouth daily      levothyroxine (SYNTHROID) 200 MCG tablet Take by mouth every morning (before breakfast)      telmisartan (MICARDIS) 80 MG tablet Take 80 mg by mouth daily      [DISCONTINUED] adalimumab (HUMIRA PEN) 40 MG/0.8ML injection Inject 40 mg into the skin every 14 days INJECT 40 MG (0.8 ML) UNDER THE SKIN EVERY 2 WEEKS 6 each 1    [DISCONTINUED] cyclobenzaprine (FLEXERIL) 10 MG tablet Take 1 pill three times a day as needed. 270 tablet 1    acetaminophen (TYLENOL) 500 MG tablet Take 500 mg by mouth every 6 hours as needed for Pain 1-2 times daily prn      Naproxen Sodium 220 MG CAPS Take by mouth      predniSONE (DELTASONE) 20 MG tablet Take 1 pill once a day after breakfast for 1 week and then 1/2 a pill once a day after breakfast for 1 week and then stop. (Patient not taking: Reported on 2023)       No facility-administered encounter medications on file as of 2023.            REVIEW OF SYSTEMS: The following systems were reviewed with patient today and were negative except for the following (depicted with an \"X\"):        \"X\" General  \"X\" Head and Neck  \"X\" Heart and Breathing  \"X\" Gastrointestinal    Fever/chills   Hair loss   Shortness of breath   Upset stomach    Falls   Dry mouth   Coughing   Diarrhea / constipation    Wt loss   Mouth sores   Wheezing   Heartburn    Wt gain   Ringing ears   Chest pain   Dark or bloody stools    Night sweats   Diff. swallowing  X None of above   Nausea or vomiting   X None of above  X None of above     X None of above                \"X\" Skin  \"X\" Neurology  \"X\" Urinary/Gyn  \"X\" Other    Easy bruising   Numbness/ tingling   Female problems   Depression    Rashes   Weakness   Problems with urination   Feeling anxious    Sun sensitivity   Headaches  X None of above   Problems sleeping   X None of above  X None of above     X None of above          Physical Exam:  Blood pressure 137/78, pulse 81, weight 207 lb (93.9 kg). General:  Patient alert, cooperative and in no apparent distress. HEENT: Pupils equally reactive to light and accommodation, no scleral injection noted. Heart: Regular rate and rhythm, normal S1 and S2, no rubs or gallops. Lungs: Clear to auscultation bilaterally. Abdomen: Soft, nontender, no hepatosplenomegaly. Skin:  No rashes. No nail abnormalities. Neurologic:  Oriented, normal speech and affect. Normal gait. Extremities:  No edema in bilateral lower extremities with no cyanosis or clubbing. Muskoskeletal Exam:     I examined the shoulders, elbows, wrists, MCPs, PIPs, DIPs and knees bilaterally for strength, range of motion, deformity, tenderness, swelling, and synovitis.       The findings are:           Physical Exam              Joint Exam 01/30/2023        Right  Left   Glenohumeral   Tender   Tender   CMC   Tender      PIP 2   Tender   Tender   PIP 3   Tender   Tender   PIP 4   Tender   Tender   PIP 5   Tender   Tender   Cervical Spine   Tender      Lumbar Spine   Tender      Knee  Swollen Tender   Tender   Tarsometatarsal   Tender [facility]:- 3.32     Patient otherwise has a normal joint exam without other evidence of joint tenderness, synovitis, warmth, erythema, decreased ROM, weakness or deformities. Radiology Reports Reviewed (if available):  Last 3 months  [unfilled]    Lab Reports Reviewed (if available): Last 3 months    No visits with results within 3 Month(s) from this visit.    Latest known visit with results is:   Office Visit on 05/24/2022   Component Date Value Ref Range Status    WBC 05/24/2022 6.0  4.3 - 11.1 K/uL Final    RBC 05/24/2022 4.16  4.05 - 5.2 M/uL Final    Hemoglobin 05/24/2022 12.5  11.7 - 15.4 g/dL Final    Hematocrit 05/24/2022 38.8  35.8 - 46.3 % Final    MCV 05/24/2022 93.3  79.6 - 97.8 FL Final    MCH 05/24/2022 30.0  26.1 - 32.9 PG Final    MCHC 05/24/2022 32.2  31.4 - 35.0 g/dL Final    RDW 05/24/2022 13.0  11.9 - 14.6 % Final    Platelets 40/58/7118 225  150 - 450 K/uL Final    MPV 05/24/2022 10.4  9.4 - 12.3 FL Final    nRBC 05/24/2022 0.00  0.0 - 0.2 K/uL Final    **Note: Absolute NRBC parameter is now reported with Hemogram**    Differential Type 05/24/2022 AUTOMATED    Final    Seg Neutrophils 05/24/2022 47  43 - 78 % Final    Lymphocytes 05/24/2022 30  13 - 44 % Final    Monocytes 05/24/2022 12  4.0 - 12.0 % Final    Eosinophils % 05/24/2022 10 (A)  0.5 - 7.8 % Final    Basophils 05/24/2022 1  0.0 - 2.0 % Final    Immature Granulocytes 05/24/2022 0  0.0 - 5.0 % Final    Segs Absolute 05/24/2022 2.8  1.7 - 8.2 K/UL Final    Absolute Lymph # 05/24/2022 1.8  0.5 - 4.6 K/UL Final    Absolute Mono # 05/24/2022 0.7  0.1 - 1.3 K/UL Final    Absolute Eos # 05/24/2022 0.6  0.0 - 0.8 K/UL Final    Basophils Absolute 05/24/2022 0.1  0.0 - 0.2 K/UL Final    Absolute Immature Granulocyte 05/24/2022 0.0  0.0 - 0.5 K/UL Final    CRP 05/24/2022 <0.3  0.0 - 0.9 mg/dL Final    Sodium 05/24/2022 139  136 - 145 mmol/L Final    Potassium 05/24/2022 4.0  3.5 - 5.1 mmol/L Final    Chloride 05/24/2022 105  98 - 107 mmol/L Final    CO2 05/24/2022 29  21 - 32 mmol/L Final    Anion Gap 05/24/2022 5 (A)  7 - 16 mmol/L Final    Glucose 05/24/2022 97  65 - 100 mg/dL Final    BUN 05/24/2022 14  8 - 23 MG/DL Final    Creatinine 05/24/2022 0.60  0.6 - 1.0 MG/DL Final    GFR  05/24/2022 >60  >60 ml/min/1.73m2 Final    GFR Non- 05/24/2022 >60  >60 ml/min/1.73m2 Final    Comment:   Estimated GFR is calculated using the Modification of Diet in Renal Disease (MDRD) Study equation, reported for both  Americans (GFRAA) and non- Americans (GFRNA), and normalized to 1.73m2 body surface area. The physician must decide which value applies to the patient. The MDRD study equation should only be used in individuals age 25 or older. It has not been validated for the following: pregnant women, patients with serious comorbid conditions,or on certain medications, or persons with extremes of body size, muscle mass, or nutritional status. Calcium 05/24/2022 9.8  8.3 - 10.4 MG/DL Final    Total Bilirubin 05/24/2022 0.3  0.2 - 1.1 MG/DL Final    ALT 05/24/2022 31  12 - 65 U/L Final    AST 05/24/2022 14 (A)  15 - 37 U/L Final    Alk Phosphatase 05/24/2022 76  50 - 136 U/L Final    Total Protein 05/24/2022 7.4  6.3 - 8.2 g/dL Final    Albumin 05/24/2022 4.2  3.2 - 4.6 g/dL Final    Globulin 05/24/2022 3.2  2.3 - 3.5 g/dL Final    Albumin/Globulin Ratio 05/24/2022 1.3  1.2 - 3.5   Final         The results above were reviewed and discussed with patient. Assessment/Plan: Clive Griffin is a 76 y.o. female who presents with:     Rheumatoid arthritis, seronegative, multiple sites [facility]): She was instructed to continue Humira 40 mg 1 shot to be administered to self every 2 weeks. Patient is aware that if she is sick requiring her to be on antibiotic or antiviral drug she will need to skip administering the Humira until she has completed the antibiotic/antiviral course and is over the infection.   - adalimumab (HUMIRA PEN) 40 MG/0.8ML injection; Inject 40 mg into the skin every 14 days INJECT 40 MG (0.8 ML) UNDER THE SKIN EVERY 2 WEEKS  -     C-Reactive Protein; Future    Muscle spasm: Patient was instructed to continue Flexeril 10 mg 1 pill every 8 hours as needed for muscle spasms. -     cyclobenzaprine (FLEXERIL) 10 MG tablet; Take 1 pill three times a day as needed. Postmenopausal disorder: Since patient is postmenopausal I did put in an order for her to have a DEXA scan done. I do plan on reviewing the DEXA results with her on her follow-up visit with me. -     DEXA BONE DENSITY AXIAL SKELETON; Future    Long-term use of high-risk medication: If there is any noted abnormality I will keep the patient informed but if not I will review her labs with her on follow-up. -     Comprehensive Metabolic Panel; Future  -     CBC with Auto Differential; Future    Screening examination for pulmonary tuberculosis: I will keep the patient informed accordingly. -     QuantiFERON In Tube; Future     Disease activity plan:  As stated above. Steroid management plan:  As stated above, if applicable. Pain management plan:  As stated above, if applicable. Weight management plan:  Weight loss through diet and exercise is always encouraged    Disease prognosis: Good    I appreciate the opportunity to continue to participate in the care of this patient. Follow-up and Dispositions    Return in about 4 months (around 5/30/2023). Electronically signed by:  Trista Zacarias MD      This note was dictated using dragon voice recognition software.   It has been proofread, but there may still exist voice recognition errors that the author did not detect.                --------------------------------------------------------------------------------------------------------------------------------------------------------------------------------------------------------------------------------

## 2023-01-31 LAB
ALBUMIN SERPL-MCNC: 4.3 G/DL (ref 3.2–4.6)
ALBUMIN/GLOB SERPL: 1.2 (ref 0.4–1.6)
ALP SERPL-CCNC: 76 U/L (ref 50–136)
ALT SERPL-CCNC: 26 U/L (ref 12–65)
ANION GAP SERPL CALC-SCNC: 8 MMOL/L (ref 2–11)
AST SERPL-CCNC: 14 U/L (ref 15–37)
BILIRUB SERPL-MCNC: 0.4 MG/DL (ref 0.2–1.1)
BUN SERPL-MCNC: 14 MG/DL (ref 8–23)
CALCIUM SERPL-MCNC: 10.3 MG/DL (ref 8.3–10.4)
CHLORIDE SERPL-SCNC: 106 MMOL/L (ref 101–110)
CO2 SERPL-SCNC: 30 MMOL/L (ref 21–32)
CREAT SERPL-MCNC: 0.6 MG/DL (ref 0.6–1)
CRP SERPL-MCNC: <0.3 MG/DL (ref 0–0.9)
GLOBULIN SER CALC-MCNC: 3.5 G/DL (ref 2.8–4.5)
GLUCOSE SERPL-MCNC: 116 MG/DL (ref 65–100)
POTASSIUM SERPL-SCNC: 3.8 MMOL/L (ref 3.5–5.1)
PROT SERPL-MCNC: 7.8 G/DL (ref 6.3–8.2)
SODIUM SERPL-SCNC: 144 MMOL/L (ref 133–143)

## 2023-02-03 LAB
M TB IFN-G BLD-IMP: NEGATIVE
M TB IFN-G CD4+ T-CELLS BLD-ACNC: 0.1 IU/ML
M TBIFN-G CD4+ CD8+T-CELLS BLD-ACNC: 0.09 IU/ML
QUANTIFERON CRITERIA: NORMAL
QUANTIFERON MITOGEN VALUE: >10 IU/ML
QUANTIFERON NIL VALUE: 0.13 IU/ML
QUANTIFERON, INCUBATION: NORMAL

## 2023-02-09 ENCOUNTER — TELEPHONE (OUTPATIENT)
Dept: RHEUMATOLOGY | Age: 68
End: 2023-02-09

## 2023-02-09 DIAGNOSIS — M06.09 RHEUMATOID ARTHRITIS, SERONEGATIVE, MULTIPLE SITES (HCC): ICD-10-CM

## 2023-02-09 DIAGNOSIS — M81.0 POSTMENOPAUSAL BONE LOSS: Primary | ICD-10-CM

## 2023-02-09 NOTE — TELEPHONE ENCOUNTER
Radiology called stating that the insurance is not wanting to cover the BMD due to the Dx code on order , ask us to check to see if there is another code we can use so that it will be covered by insurance if so to put in a new order with that Dx code

## 2023-02-10 NOTE — TELEPHONE ENCOUNTER
New order put in for DEXA with diagnosis code postmenopausal bone loss and RA. Please let radiology know this.

## 2023-05-23 ENCOUNTER — OFFICE VISIT (OUTPATIENT)
Dept: RHEUMATOLOGY | Age: 68
End: 2023-05-23
Payer: MEDICARE

## 2023-05-23 VITALS
HEART RATE: 88 BPM | HEIGHT: 61 IN | WEIGHT: 214 LBS | BODY MASS INDEX: 40.4 KG/M2 | DIASTOLIC BLOOD PRESSURE: 85 MMHG | SYSTOLIC BLOOD PRESSURE: 139 MMHG

## 2023-05-23 DIAGNOSIS — M06.09 RHEUMATOID ARTHRITIS, SERONEGATIVE, MULTIPLE SITES (HCC): ICD-10-CM

## 2023-05-23 DIAGNOSIS — Z79.899 LONG-TERM USE OF HIGH-RISK MEDICATION: ICD-10-CM

## 2023-05-23 DIAGNOSIS — M62.838 MUSCLE SPASM: ICD-10-CM

## 2023-05-23 DIAGNOSIS — M06.09 RHEUMATOID ARTHRITIS, SERONEGATIVE, MULTIPLE SITES (HCC): Primary | ICD-10-CM

## 2023-05-23 DIAGNOSIS — Z71.89 ENCOUNTER FOR MEDICATION REVIEW AND COUNSELING: ICD-10-CM

## 2023-05-23 LAB
BASOPHILS # BLD: 0.1 K/UL (ref 0–0.2)
BASOPHILS NFR BLD: 1 % (ref 0–2)
DIFFERENTIAL METHOD BLD: ABNORMAL
EOSINOPHIL # BLD: 0.5 K/UL (ref 0–0.8)
EOSINOPHIL NFR BLD: 6 % (ref 0.5–7.8)
ERYTHROCYTE [DISTWIDTH] IN BLOOD BY AUTOMATED COUNT: 12.3 % (ref 11.9–14.6)
HCT VFR BLD AUTO: 41.9 % (ref 35.8–46.3)
HGB BLD-MCNC: 13.1 G/DL (ref 11.7–15.4)
IMM GRANULOCYTES # BLD AUTO: 0 K/UL (ref 0–0.5)
IMM GRANULOCYTES NFR BLD AUTO: 0 % (ref 0–5)
LYMPHOCYTES # BLD: 3 K/UL (ref 0.5–4.6)
LYMPHOCYTES NFR BLD: 33 % (ref 13–44)
MCH RBC QN AUTO: 29.9 PG (ref 26.1–32.9)
MCHC RBC AUTO-ENTMCNC: 31.3 G/DL (ref 31.4–35)
MCV RBC AUTO: 95.7 FL (ref 82–102)
MONOCYTES # BLD: 0.7 K/UL (ref 0.1–1.3)
MONOCYTES NFR BLD: 7 % (ref 4–12)
NEUTS SEG # BLD: 4.8 K/UL (ref 1.7–8.2)
NEUTS SEG NFR BLD: 53 % (ref 43–78)
NRBC # BLD: 0 K/UL (ref 0–0.2)
PLATELET # BLD AUTO: 275 K/UL (ref 150–450)
PMV BLD AUTO: 10.5 FL (ref 9.4–12.3)
RBC # BLD AUTO: 4.38 M/UL (ref 4.05–5.2)
WBC # BLD AUTO: 9.1 K/UL (ref 4.3–11.1)

## 2023-05-23 PROCEDURE — G8427 DOCREV CUR MEDS BY ELIG CLIN: HCPCS | Performed by: INTERNAL MEDICINE

## 2023-05-23 PROCEDURE — G8400 PT W/DXA NO RESULTS DOC: HCPCS | Performed by: INTERNAL MEDICINE

## 2023-05-23 PROCEDURE — 1036F TOBACCO NON-USER: CPT | Performed by: INTERNAL MEDICINE

## 2023-05-23 PROCEDURE — 1090F PRES/ABSN URINE INCON ASSESS: CPT | Performed by: INTERNAL MEDICINE

## 2023-05-23 PROCEDURE — 3017F COLORECTAL CA SCREEN DOC REV: CPT | Performed by: INTERNAL MEDICINE

## 2023-05-23 PROCEDURE — G8417 CALC BMI ABV UP PARAM F/U: HCPCS | Performed by: INTERNAL MEDICINE

## 2023-05-23 PROCEDURE — 99214 OFFICE O/P EST MOD 30 MIN: CPT | Performed by: INTERNAL MEDICINE

## 2023-05-23 PROCEDURE — 1123F ACP DISCUSS/DSCN MKR DOCD: CPT | Performed by: INTERNAL MEDICINE

## 2023-05-23 RX ORDER — CYCLOBENZAPRINE HCL 10 MG
TABLET ORAL
Qty: 270 TABLET | Refills: 1 | Status: SHIPPED | OUTPATIENT
Start: 2023-05-23

## 2023-05-23 ASSESSMENT — JOINT PAIN
TOTAL NUMBER OF SWOLLEN JOINTS: 1
TOTAL NUMBER OF TENDER JOINTS: 2

## 2023-05-23 ASSESSMENT — ROUTINE ASSESSMENT OF PATIENT INDEX DATA (RAPID3)
ON A SCALE OF ONE TO TEN, HOW DIFFICULT WAS IT FOR YOU TO COMPLETE THE LISTED DAILY PHYSICAL TASKS OVER THE LAST WEEK: 0.9
WHEN YOU AWAKENED IN THE MORNING OVER THE LAST WEEK, PLEASE INDICATE THE AMOUNT OF TIME IT TAKES UNTIL YOU ARE AS LIMBER AS YOU WILL BE FOR THE DAY: 1 HOUR
ON A SCALE OF ONE TO TEN, HOW MUCH PAIN HAVE YOU HAD BECAUSE OF YOUR CONDITION OVER THE PAST WEEK?: 3
ON A SCALE OF ONE TO TEN, CONSIDERING ALL THE WAYS IN WHICH ILLNESS AND HEALTH CONDITIONS MAY AFFECT YOU AT THIS TIME, PLEASE INDICATE BELOW HOW YOU ARE DOING:: 4
ON A SCALE OF ONE TO TEN, HOW MUCH OF A PROBLEM HAS UNUSUAL FATIGUE OR TIREDNESS BEEN FOR YOU OVER THE PAST WEEK?: 2

## 2023-05-23 NOTE — PROGRESS NOTES
plan:  As stated above, if applicable. Weight management plan:  Weight loss through diet and exercise is always encouraged    Disease prognosis: Good    I appreciate the opportunity to continue to participate in the care of this patient. Follow-up and Dispositions    Return in about 4 months (around 9/23/2023). Electronically signed by:  Hailey Kramer MD      This note was dictated using dragon voice recognition software.   It has been proofread, but there may still exist voice recognition errors that the author did not detect.                --------------------------------------------------------------------------------------------------------------------------------------------------------------------------------------------------------------------------------

## 2023-05-24 LAB
ALBUMIN SERPL-MCNC: 4.1 G/DL (ref 3.2–4.6)
ALBUMIN/GLOB SERPL: 1.2 (ref 0.4–1.6)
ALP SERPL-CCNC: 88 U/L (ref 50–136)
ALT SERPL-CCNC: 22 U/L (ref 12–65)
ANION GAP SERPL CALC-SCNC: 7 MMOL/L (ref 2–11)
AST SERPL-CCNC: 12 U/L (ref 15–37)
BILIRUB SERPL-MCNC: 0.4 MG/DL (ref 0.2–1.1)
BUN SERPL-MCNC: 13 MG/DL (ref 8–23)
CALCIUM SERPL-MCNC: 9.9 MG/DL (ref 8.3–10.4)
CHLORIDE SERPL-SCNC: 105 MMOL/L (ref 101–110)
CO2 SERPL-SCNC: 26 MMOL/L (ref 21–32)
CREAT SERPL-MCNC: 0.7 MG/DL (ref 0.6–1)
CRP SERPL-MCNC: <0.3 MG/DL (ref 0–0.9)
GLOBULIN SER CALC-MCNC: 3.4 G/DL (ref 2.8–4.5)
GLUCOSE SERPL-MCNC: 98 MG/DL (ref 65–100)
POTASSIUM SERPL-SCNC: 4.2 MMOL/L (ref 3.5–5.1)
PROT SERPL-MCNC: 7.5 G/DL (ref 6.3–8.2)
SODIUM SERPL-SCNC: 138 MMOL/L (ref 133–143)

## 2023-07-10 ENCOUNTER — HOSPITAL ENCOUNTER (OUTPATIENT)
Dept: MAMMOGRAPHY | Age: 68
Discharge: HOME | End: 2023-07-13
Payer: MEDICARE

## 2023-07-10 DIAGNOSIS — M81.0 POSTMENOPAUSAL BONE LOSS: ICD-10-CM

## 2023-07-10 DIAGNOSIS — M06.09 RHEUMATOID ARTHRITIS, SERONEGATIVE, MULTIPLE SITES (HCC): ICD-10-CM

## 2023-07-10 PROCEDURE — 77080 DXA BONE DENSITY AXIAL: CPT

## 2023-10-11 ENCOUNTER — TELEMEDICINE (OUTPATIENT)
Dept: RHEUMATOLOGY | Age: 68
End: 2023-10-11
Payer: MEDICARE

## 2023-10-11 DIAGNOSIS — M62.838 MUSCLE SPASM: ICD-10-CM

## 2023-10-11 DIAGNOSIS — Z79.899 LONG-TERM USE OF HIGH-RISK MEDICATION: ICD-10-CM

## 2023-10-11 DIAGNOSIS — M06.09 RHEUMATOID ARTHRITIS, SERONEGATIVE, MULTIPLE SITES (HCC): Primary | ICD-10-CM

## 2023-10-11 PROCEDURE — G8399 PT W/DXA RESULTS DOCUMENT: HCPCS | Performed by: INTERNAL MEDICINE

## 2023-10-11 PROCEDURE — G8417 CALC BMI ABV UP PARAM F/U: HCPCS | Performed by: INTERNAL MEDICINE

## 2023-10-11 PROCEDURE — G8484 FLU IMMUNIZE NO ADMIN: HCPCS | Performed by: INTERNAL MEDICINE

## 2023-10-11 PROCEDURE — 1036F TOBACCO NON-USER: CPT | Performed by: INTERNAL MEDICINE

## 2023-10-11 PROCEDURE — 1090F PRES/ABSN URINE INCON ASSESS: CPT | Performed by: INTERNAL MEDICINE

## 2023-10-11 PROCEDURE — 99214 OFFICE O/P EST MOD 30 MIN: CPT | Performed by: INTERNAL MEDICINE

## 2023-10-11 PROCEDURE — 1123F ACP DISCUSS/DSCN MKR DOCD: CPT | Performed by: INTERNAL MEDICINE

## 2023-10-11 PROCEDURE — G8428 CUR MEDS NOT DOCUMENT: HCPCS | Performed by: INTERNAL MEDICINE

## 2023-10-11 PROCEDURE — 3017F COLORECTAL CA SCREEN DOC REV: CPT | Performed by: INTERNAL MEDICINE

## 2023-10-11 RX ORDER — CYCLOBENZAPRINE HCL 10 MG
TABLET ORAL
Qty: 270 TABLET | Refills: 1 | Status: SHIPPED | OUTPATIENT
Start: 2023-10-11

## 2023-10-11 NOTE — PROGRESS NOTES
Ialn Hansen M.D.  [address] [place] [address]., [address] [place] [address], [address] [place] [address]  Office : [phone], Fax: [phone] OFFICE VISIT NOTE  Date of Visit:  10/11/2023 2:23 PM    Patient Information:  Name:  Marisela Saba  :  1955  Age:  76 y.o. Gender:  female      Ms. Matt Costa is here today for follow-up of RA and medication monitoring. Last visit: 2023    History of Present Illness: On talking to the patient today she states that she is having a revision of the right knee sometime in the month of November/December from the surgery that was done last year. Patient states that she did see her orthopedic surgeon and she should be going for preop clearance soon. Since she last saw me she has not had the need to skip administering her Humira for any reason. Her current joint complaints are as mentioned below. Since the last visit, patient is feeling \"good\". Pain: 3/10  Location:  Some left knee pain worse than the right knee pain. Occasional buckling of the left knee. Bilateral knee swelling with warmth worse in the right than the left knee. Quality:    Modifying Factors:    Associated Symptoms: No tingling, numbness or pain down the arms or legs. No UE or LE weakness. No limitations with her ADL's. Last TB screen: 23  TB result: Negative    Current dose of steroids: NA  How long on current dose of steroids: NA  How long on continuous steroid therapy: NA      Past DMARDs, if applicable (methotrexate, plaquenil/hydroxychloroquine, sulfasalazine, Arava/leflunomide): Was on Plaquenil 200 mg twice a day and Methotrexate 7.5 mg weekly in the past but was ineffective after a while.       Past biologics, if applicable (enbrel, humira, simponi, cimzia, Doneta Ort, Ianton, remicade, simponi aria, actemra, rituximab, Orien Natacha, stelara, cosentyx): Currently on Humira 40 mg subcu 1 shot to be administered to self every 2 weeks (every other

## 2024-02-13 ENCOUNTER — OFFICE VISIT (OUTPATIENT)
Dept: RHEUMATOLOGY | Age: 69
End: 2024-02-13
Payer: MEDICARE

## 2024-02-13 VITALS
DIASTOLIC BLOOD PRESSURE: 73 MMHG | HEIGHT: 61 IN | HEART RATE: 87 BPM | BODY MASS INDEX: 42.48 KG/M2 | SYSTOLIC BLOOD PRESSURE: 129 MMHG | WEIGHT: 225 LBS

## 2024-02-13 DIAGNOSIS — Z79.899 LONG-TERM USE OF HIGH-RISK MEDICATION: ICD-10-CM

## 2024-02-13 DIAGNOSIS — M62.838 MUSCLE SPASM: ICD-10-CM

## 2024-02-13 DIAGNOSIS — M06.09 RHEUMATOID ARTHRITIS, SERONEGATIVE, MULTIPLE SITES (HCC): Primary | ICD-10-CM

## 2024-02-13 PROCEDURE — 1090F PRES/ABSN URINE INCON ASSESS: CPT | Performed by: INTERNAL MEDICINE

## 2024-02-13 PROCEDURE — 1123F ACP DISCUSS/DSCN MKR DOCD: CPT | Performed by: INTERNAL MEDICINE

## 2024-02-13 PROCEDURE — G8417 CALC BMI ABV UP PARAM F/U: HCPCS | Performed by: INTERNAL MEDICINE

## 2024-02-13 PROCEDURE — 1036F TOBACCO NON-USER: CPT | Performed by: INTERNAL MEDICINE

## 2024-02-13 PROCEDURE — G8484 FLU IMMUNIZE NO ADMIN: HCPCS | Performed by: INTERNAL MEDICINE

## 2024-02-13 PROCEDURE — 99214 OFFICE O/P EST MOD 30 MIN: CPT | Performed by: INTERNAL MEDICINE

## 2024-02-13 PROCEDURE — 3017F COLORECTAL CA SCREEN DOC REV: CPT | Performed by: INTERNAL MEDICINE

## 2024-02-13 PROCEDURE — G8427 DOCREV CUR MEDS BY ELIG CLIN: HCPCS | Performed by: INTERNAL MEDICINE

## 2024-02-13 PROCEDURE — G8399 PT W/DXA RESULTS DOCUMENT: HCPCS | Performed by: INTERNAL MEDICINE

## 2024-02-13 RX ORDER — HYDROCODONE BITARTRATE AND ACETAMINOPHEN 7.5; 325 MG/1; MG/1
1 TABLET ORAL 2 TIMES DAILY PRN
COMMUNITY

## 2024-02-13 NOTE — PROGRESS NOTES
05/23/2023 4.2  3.5 - 5.1 mmol/L Final    Chloride 05/23/2023 105  101 - 110 mmol/L Final    CO2 05/23/2023 26  21 - 32 mmol/L Final    Anion Gap 05/23/2023 7  2 - 11 mmol/L Final    Glucose 05/23/2023 98  65 - 100 mg/dL Final    BUN 05/23/2023 13  8 - 23 MG/DL Final    Creatinine 05/23/2023 0.70  0.6 - 1.0 MG/DL Final    Est, Glom Filt Rate 05/23/2023 >60  >60 ml/min/1.73m2 Final    Comment:   Pediatric calculator link: https://www.kidney.org/professionals/kdoqi/gfr_calculatorped    These results are not intended for use in patients <18 years of age.    eGFR results are calculated without a race factor using  the 2021 CKD-EPI equation. Careful clinical correlation is recommended, particularly when comparing to results calculated using previous equations.  The CKD-EPI equation is less accurate in patients with extremes of muscle mass, extra-renal metabolism of creatinine, excessive creatine ingestion, or following therapy that affects renal tubular secretion.      Calcium 05/23/2023 9.9  8.3 - 10.4 MG/DL Final    Total Bilirubin 05/23/2023 0.4  0.2 - 1.1 MG/DL Final    ALT 05/23/2023 22  12 - 65 U/L Final    AST 05/23/2023 12 (L)  15 - 37 U/L Final    Alk Phosphatase 05/23/2023 88  50 - 136 U/L Final    Total Protein 05/23/2023 7.5  6.3 - 8.2 g/dL Final    Albumin 05/23/2023 4.1  3.2 - 4.6 g/dL Final    Globulin 05/23/2023 3.4  2.8 - 4.5 g/dL Final    Albumin/Globulin Ratio 05/23/2023 1.2  0.4 - 1.6   Final    WBC 05/23/2023 9.1  4.3 - 11.1 K/uL Final    RBC 05/23/2023 4.38  4.05 - 5.2 M/uL Final    Hemoglobin 05/23/2023 13.1  11.7 - 15.4 g/dL Final    Hematocrit 05/23/2023 41.9  35.8 - 46.3 % Final    MCV 05/23/2023 95.7  82 - 102 FL Final    MCH 05/23/2023 29.9  26.1 - 32.9 PG Final    MCHC 05/23/2023 31.3 (L)  31.4 - 35.0 g/dL Final    RDW 05/23/2023 12.3  11.9 - 14.6 % Final    Platelets 05/23/2023 275  150 - 450 K/uL Final    MPV 05/23/2023 10.5  9.4 - 12.3 FL Final    nRBC 05/23/2023 0.00  0.0 - 0.2 K/uL Final

## 2024-02-29 ENCOUNTER — TELEPHONE (OUTPATIENT)
Dept: RHEUMATOLOGY | Age: 69
End: 2024-02-29

## 2024-02-29 NOTE — TELEPHONE ENCOUNTER
PA for Humira 40 mg/0.8 ml (how rx is written) started online on CMM. Sent to [facility] with clinicals.   Your request has been approved  CaseId:[id number];Status:Approved;Review Type:Prior Auth;Coverage Start Date:01/30/2024;Coverage End Date:12/31/2024;

## 2024-06-20 ENCOUNTER — OFFICE VISIT (OUTPATIENT)
Dept: RHEUMATOLOGY | Age: 69
End: 2024-06-20
Payer: MEDICARE

## 2024-06-20 VITALS
BODY MASS INDEX: 41.8 KG/M2 | SYSTOLIC BLOOD PRESSURE: 134 MMHG | HEART RATE: 81 BPM | DIASTOLIC BLOOD PRESSURE: 74 MMHG | HEIGHT: 61 IN | WEIGHT: 221.4 LBS

## 2024-06-20 DIAGNOSIS — Z79.899 LONG-TERM USE OF HIGH-RISK MEDICATION: ICD-10-CM

## 2024-06-20 DIAGNOSIS — M62.838 MUSCLE SPASM: ICD-10-CM

## 2024-06-20 DIAGNOSIS — Z11.1 SCREENING EXAMINATION FOR PULMONARY TUBERCULOSIS: ICD-10-CM

## 2024-06-20 DIAGNOSIS — M06.09 RHEUMATOID ARTHRITIS, SERONEGATIVE, MULTIPLE SITES (HCC): Primary | ICD-10-CM

## 2024-06-20 DIAGNOSIS — M06.09 RHEUMATOID ARTHRITIS, SERONEGATIVE, MULTIPLE SITES (HCC): ICD-10-CM

## 2024-06-20 LAB
ALBUMIN SERPL-MCNC: 4.4 G/DL (ref 3.2–4.6)
ALBUMIN/GLOB SERPL: 1.5 (ref 1–1.9)
ALP SERPL-CCNC: 67 U/L (ref 35–104)
ALT SERPL-CCNC: 30 U/L (ref 12–65)
ANION GAP SERPL CALC-SCNC: 11 MMOL/L (ref 9–18)
AST SERPL-CCNC: 29 U/L (ref 15–37)
BASOPHILS # BLD: 0.1 K/UL (ref 0–0.2)
BASOPHILS NFR BLD: 1 % (ref 0–2)
BILIRUB SERPL-MCNC: 0.4 MG/DL (ref 0–1.2)
BUN SERPL-MCNC: 9 MG/DL (ref 8–23)
CALCIUM SERPL-MCNC: 10.2 MG/DL (ref 8.8–10.2)
CHLORIDE SERPL-SCNC: 101 MMOL/L (ref 98–107)
CO2 SERPL-SCNC: 28 MMOL/L (ref 20–28)
CREAT SERPL-MCNC: 0.78 MG/DL (ref 0.6–1.1)
DIFFERENTIAL METHOD BLD: NORMAL
EOSINOPHIL # BLD: 0.6 K/UL (ref 0–0.8)
EOSINOPHIL NFR BLD: 7 % (ref 0.5–7.8)
ERYTHROCYTE [DISTWIDTH] IN BLOOD BY AUTOMATED COUNT: 13 % (ref 11.9–14.6)
ERYTHROCYTE [SEDIMENTATION RATE] IN BLOOD: 4 MM/HR (ref 0–30)
GLOBULIN SER CALC-MCNC: 2.9 G/DL (ref 2.3–3.5)
GLUCOSE SERPL-MCNC: 120 MG/DL (ref 70–99)
HCT VFR BLD AUTO: 41.9 % (ref 35.8–46.3)
HGB BLD-MCNC: 13.2 G/DL (ref 11.7–15.4)
IMM GRANULOCYTES # BLD AUTO: 0 K/UL (ref 0–0.5)
IMM GRANULOCYTES NFR BLD AUTO: 0 % (ref 0–5)
LYMPHOCYTES # BLD: 2.8 K/UL (ref 0.5–4.6)
LYMPHOCYTES NFR BLD: 32 % (ref 13–44)
MCH RBC QN AUTO: 29.7 PG (ref 26.1–32.9)
MCHC RBC AUTO-ENTMCNC: 31.5 G/DL (ref 31.4–35)
MCV RBC AUTO: 94.2 FL (ref 82–102)
MONOCYTES # BLD: 0.6 K/UL (ref 0.1–1.3)
MONOCYTES NFR BLD: 6 % (ref 4–12)
NEUTS SEG # BLD: 4.7 K/UL (ref 1.7–8.2)
NEUTS SEG NFR BLD: 54 % (ref 43–78)
NRBC # BLD: 0 K/UL (ref 0–0.2)
PLATELET # BLD AUTO: 274 K/UL (ref 150–450)
PMV BLD AUTO: 10.5 FL (ref 9.4–12.3)
POTASSIUM SERPL-SCNC: 3.8 MMOL/L (ref 3.5–5.1)
PROT SERPL-MCNC: 7.3 G/DL (ref 6.3–8.2)
RBC # BLD AUTO: 4.45 M/UL (ref 4.05–5.2)
SODIUM SERPL-SCNC: 140 MMOL/L (ref 136–145)
WBC # BLD AUTO: 8.8 K/UL (ref 4.3–11.1)

## 2024-06-20 PROCEDURE — 1090F PRES/ABSN URINE INCON ASSESS: CPT | Performed by: INTERNAL MEDICINE

## 2024-06-20 PROCEDURE — G8399 PT W/DXA RESULTS DOCUMENT: HCPCS | Performed by: INTERNAL MEDICINE

## 2024-06-20 PROCEDURE — 1036F TOBACCO NON-USER: CPT | Performed by: INTERNAL MEDICINE

## 2024-06-20 PROCEDURE — 3017F COLORECTAL CA SCREEN DOC REV: CPT | Performed by: INTERNAL MEDICINE

## 2024-06-20 PROCEDURE — 99214 OFFICE O/P EST MOD 30 MIN: CPT | Performed by: INTERNAL MEDICINE

## 2024-06-20 PROCEDURE — G8417 CALC BMI ABV UP PARAM F/U: HCPCS | Performed by: INTERNAL MEDICINE

## 2024-06-20 PROCEDURE — G8427 DOCREV CUR MEDS BY ELIG CLIN: HCPCS | Performed by: INTERNAL MEDICINE

## 2024-06-20 PROCEDURE — 1123F ACP DISCUSS/DSCN MKR DOCD: CPT | Performed by: INTERNAL MEDICINE

## 2024-06-20 RX ORDER — ASPIRIN 325 MG
325 TABLET, DELAYED RELEASE (ENTERIC COATED) ORAL 2 TIMES DAILY
COMMUNITY

## 2024-06-20 RX ORDER — FOLIC ACID 1 MG/1
1 TABLET ORAL DAILY
Qty: 90 TABLET | Refills: 1 | Status: SHIPPED | OUTPATIENT
Start: 2024-06-20

## 2024-06-20 ASSESSMENT — ROUTINE ASSESSMENT OF PATIENT INDEX DATA (RAPID3)
ON A SCALE OF ONE TO TEN, HOW MUCH OF A PROBLEM HAS UNUSUAL FATIGUE OR TIREDNESS BEEN FOR YOU OVER THE PAST WEEK?: 3
WHEN YOU AWAKENED IN THE MORNING OVER THE LAST WEEK, PLEASE INDICATE THE AMOUNT OF TIME IT TAKES UNTIL YOU ARE AS LIMBER AS YOU WILL BE FOR THE DAY: 1 HOUR
ON A SCALE OF ONE TO TEN, HOW MUCH PAIN HAVE YOU HAD BECAUSE OF YOUR CONDITION OVER THE PAST WEEK?: 4
ON A SCALE OF ONE TO TEN, HOW DIFFICULT WAS IT FOR YOU TO COMPLETE THE LISTED DAILY PHYSICAL TASKS OVER THE LAST WEEK: 0.7
ON A SCALE OF ONE TO TEN, CONSIDERING ALL THE WAYS IN WHICH ILLNESS AND HEALTH CONDITIONS MAY AFFECT YOU AT THIS TIME, PLEASE INDICATE BELOW HOW YOU ARE DOING:: 5

## 2024-06-20 ASSESSMENT — JOINT PAIN
TOTAL NUMBER OF TENDER JOINTS: 1
TOTAL NUMBER OF SWOLLEN JOINTS: 1

## 2024-06-20 NOTE — PROGRESS NOTES
follow-up visit.  -     CBC with Auto Differential; Future  -     Comprehensive Metabolic Panel; Future    Screening examination for pulmonary tuberculosis:        -     Quantiferon, Incubated; Future    Disease activity plan:  As stated above.    Steroid management plan:  As stated above, if applicable.    Pain management plan:  As stated above, if applicable.    Weight management plan:  Weight loss through diet and exercise is always encouraged    Disease prognosis: Good    I appreciate the opportunity to continue to participate in the care of this patient.     Follow-up and Dispositions    Return in about 4 months (around 10/20/2024).       Electronically signed by:  Bhavana Sanchez MD      This note was dictated using dragon voice recognition software.  It has been proofread, but there may still exist voice recognition errors that the author did not detect.                --------------------------------------------------------------------------------------------------------------------------------------------------------------------------------------------------------------------------------

## 2024-06-26 LAB
M TB IFN-G BLD-IMP: NEGATIVE
M TB IFN-G CD4+ T-CELLS BLD-ACNC: 0.08 IU/ML
M TBIFN-G CD4+ CD8+T-CELLS BLD-ACNC: 0.1 IU/ML
QUANTIFERON CRITERIA: NORMAL
QUANTIFERON MITOGEN VALUE: >10 IU/ML
QUANTIFERON NIL VALUE: 0.13 IU/ML

## 2024-10-22 ENCOUNTER — OFFICE VISIT (OUTPATIENT)
Dept: RHEUMATOLOGY | Age: 69
End: 2024-10-22
Payer: MEDICARE

## 2024-10-22 VITALS
DIASTOLIC BLOOD PRESSURE: 83 MMHG | BODY MASS INDEX: 41.16 KG/M2 | HEART RATE: 80 BPM | HEIGHT: 61 IN | SYSTOLIC BLOOD PRESSURE: 137 MMHG | WEIGHT: 218 LBS

## 2024-10-22 DIAGNOSIS — M06.09 RHEUMATOID ARTHRITIS, SERONEGATIVE, MULTIPLE SITES (HCC): Primary | ICD-10-CM

## 2024-10-22 DIAGNOSIS — M62.838 MUSCLE SPASM: ICD-10-CM

## 2024-10-22 DIAGNOSIS — Z79.899 LONG-TERM USE OF HIGH-RISK MEDICATION: ICD-10-CM

## 2024-10-22 DIAGNOSIS — M06.09 RHEUMATOID ARTHRITIS, SERONEGATIVE, MULTIPLE SITES (HCC): ICD-10-CM

## 2024-10-22 LAB
ALBUMIN SERPL-MCNC: 4 G/DL (ref 3.2–4.6)
ALBUMIN/GLOB SERPL: 1.2 (ref 1–1.9)
ALP SERPL-CCNC: 95 U/L (ref 35–104)
ALT SERPL-CCNC: 22 U/L (ref 8–45)
ANION GAP SERPL CALC-SCNC: 10 MMOL/L (ref 9–18)
AST SERPL-CCNC: 19 U/L (ref 15–37)
BASOPHILS # BLD: 0.1 K/UL (ref 0–0.2)
BASOPHILS NFR BLD: 1 % (ref 0–2)
BILIRUB SERPL-MCNC: 0.3 MG/DL (ref 0–1.2)
BUN SERPL-MCNC: 13 MG/DL (ref 8–23)
CALCIUM SERPL-MCNC: 10 MG/DL (ref 8.8–10.2)
CHLORIDE SERPL-SCNC: 101 MMOL/L (ref 98–107)
CO2 SERPL-SCNC: 29 MMOL/L (ref 20–28)
CREAT SERPL-MCNC: 0.83 MG/DL (ref 0.6–1.1)
DIFFERENTIAL METHOD BLD: ABNORMAL
EOSINOPHIL # BLD: 0.8 K/UL (ref 0–0.8)
EOSINOPHIL NFR BLD: 9 % (ref 0.5–7.8)
ERYTHROCYTE [DISTWIDTH] IN BLOOD BY AUTOMATED COUNT: 13 % (ref 11.9–14.6)
GLOBULIN SER CALC-MCNC: 3.3 G/DL (ref 2.3–3.5)
GLUCOSE SERPL-MCNC: 106 MG/DL (ref 70–99)
HCT VFR BLD AUTO: 39.7 % (ref 35.8–46.3)
HGB BLD-MCNC: 12.5 G/DL (ref 11.7–15.4)
IMM GRANULOCYTES # BLD AUTO: 0 K/UL (ref 0–0.5)
IMM GRANULOCYTES NFR BLD AUTO: 0 % (ref 0–5)
LYMPHOCYTES # BLD: 3.3 K/UL (ref 0.5–4.6)
LYMPHOCYTES NFR BLD: 34 % (ref 13–44)
MCH RBC QN AUTO: 30.4 PG (ref 26.1–32.9)
MCHC RBC AUTO-ENTMCNC: 31.5 G/DL (ref 31.4–35)
MCV RBC AUTO: 96.6 FL (ref 82–102)
MONOCYTES # BLD: 0.6 K/UL (ref 0.1–1.3)
MONOCYTES NFR BLD: 6 % (ref 4–12)
NEUTS SEG # BLD: 4.8 K/UL (ref 1.7–8.2)
NEUTS SEG NFR BLD: 50 % (ref 43–78)
NRBC # BLD: 0 K/UL (ref 0–0.2)
PLATELET # BLD AUTO: 288 K/UL (ref 150–450)
PMV BLD AUTO: 10 FL (ref 9.4–12.3)
POTASSIUM SERPL-SCNC: 4 MMOL/L (ref 3.5–5.1)
PROT SERPL-MCNC: 7.4 G/DL (ref 6.3–8.2)
RBC # BLD AUTO: 4.11 M/UL (ref 4.05–5.2)
SODIUM SERPL-SCNC: 139 MMOL/L (ref 136–145)
WBC # BLD AUTO: 9.6 K/UL (ref 4.3–11.1)

## 2024-10-22 PROCEDURE — 1036F TOBACCO NON-USER: CPT | Performed by: INTERNAL MEDICINE

## 2024-10-22 PROCEDURE — G8399 PT W/DXA RESULTS DOCUMENT: HCPCS | Performed by: INTERNAL MEDICINE

## 2024-10-22 PROCEDURE — 99214 OFFICE O/P EST MOD 30 MIN: CPT | Performed by: INTERNAL MEDICINE

## 2024-10-22 PROCEDURE — 3017F COLORECTAL CA SCREEN DOC REV: CPT | Performed by: INTERNAL MEDICINE

## 2024-10-22 PROCEDURE — 1123F ACP DISCUSS/DSCN MKR DOCD: CPT | Performed by: INTERNAL MEDICINE

## 2024-10-22 PROCEDURE — G8417 CALC BMI ABV UP PARAM F/U: HCPCS | Performed by: INTERNAL MEDICINE

## 2024-10-22 PROCEDURE — G8427 DOCREV CUR MEDS BY ELIG CLIN: HCPCS | Performed by: INTERNAL MEDICINE

## 2024-10-22 PROCEDURE — 1090F PRES/ABSN URINE INCON ASSESS: CPT | Performed by: INTERNAL MEDICINE

## 2024-10-22 PROCEDURE — 1160F RVW MEDS BY RX/DR IN RCRD: CPT | Performed by: INTERNAL MEDICINE

## 2024-10-22 PROCEDURE — G8484 FLU IMMUNIZE NO ADMIN: HCPCS | Performed by: INTERNAL MEDICINE

## 2024-10-22 PROCEDURE — 1159F MED LIST DOCD IN RCRD: CPT | Performed by: INTERNAL MEDICINE

## 2024-10-22 PROCEDURE — G2211 COMPLEX E/M VISIT ADD ON: HCPCS | Performed by: INTERNAL MEDICINE

## 2024-10-22 RX ORDER — FOLIC ACID 1 MG/1
1 TABLET ORAL DAILY
Qty: 90 TABLET | Refills: 1 | Status: SHIPPED | OUTPATIENT
Start: 2024-10-22

## 2024-10-22 ASSESSMENT — ROUTINE ASSESSMENT OF PATIENT INDEX DATA (RAPID3)
WHEN YOU AWAKENED IN THE MORNING OVER THE LAST WEEK, PLEASE INDICATE THE AMOUNT OF TIME IT TAKES UNTIL YOU ARE AS LIMBER AS YOU WILL BE FOR THE DAY: 1 HOUR
ON A SCALE OF ONE TO TEN, HOW MUCH OF A PROBLEM HAS UNUSUAL FATIGUE OR TIREDNESS BEEN FOR YOU OVER THE PAST WEEK?: 2
ON A SCALE OF ONE TO TEN, CONSIDERING ALL THE WAYS IN WHICH ILLNESS AND HEALTH CONDITIONS MAY AFFECT YOU AT THIS TIME, PLEASE INDICATE BELOW HOW YOU ARE DOING:: 4
ON A SCALE OF ONE TO TEN, HOW MUCH PAIN HAVE YOU HAD BECAUSE OF YOUR CONDITION OVER THE PAST WEEK?: 4
ON A SCALE OF ONE TO TEN, HOW DIFFICULT WAS IT FOR YOU TO COMPLETE THE LISTED DAILY PHYSICAL TASKS OVER THE LAST WEEK: 0.3

## 2024-10-22 NOTE — PROGRESS NOTES
[facility]  Bhavana Sanchez M.D.  [address] , [address]   [facility]  Office : [phone], Fax: [phone]     RHEUMATOLOGY OFFICE VISIT NOTE  Date of Visit:  10/22/2024 3:26 PM    Patient Information:  Name:  Zoya Torres  :  1955  Age:  69 y.o.   Gender:  female      Ms. Torres is here today for follow-up of RA, muscle spasms and medication monitoring.     Last visit: 24    History of Present Illness: On talking to the patient today she states that she had to skip the Humira last week secondary to dental issues requiring her to be on prophylactic antibiotic for her dental procedure since she has prosthetic knees.  On talking to her further she states that she had a total right knee revision on  and has started PT twice a week.  She is now not having the need to take any pain medication while doing PT and does believe that her right knee is recovering well with very minimal pain and good range of motion.  Her other current joint complaints are as mentioned below.    Since the last visit, patient is feeling \"very good\".    Pain: 4/10  Location: Some right knee pain worse than the left knee pain some swelling of the right knee with no warmth and redness. No lower back and hip pain. Bilateral shoulder pain. No para spinal muscle pain.   Quality:  Sore feeling and occasionally sharp in the knees.   Modifying Factors:  1st thing in the morning the stiffness is the worst and at night the pain comes back when the knee is resting on each other.   Associated Symptoms:  No tingling, numbness or pain down the arms or legs. No limitations with her ADL's.         10/22/2024     3:00 PM   DMARD/Biologic   AM Stiffness 1 hour   Pain 4   Fatigue 2   MDHAQ 0.3   Patient Global Score 4   Medication Name Humira     Last TB screen: 24  TB result: Negative     Current dose of steroids: NA  How long on current dose of steroids: NA  How long on

## 2024-10-23 ASSESSMENT — JOINT PAIN
TOTAL NUMBER OF SWOLLEN JOINTS: 1
TOTAL NUMBER OF TENDER JOINTS: 3

## 2024-10-24 LAB — CRP SERPL-MCNC: 2 MG/L (ref 0–10)

## 2025-02-26 ENCOUNTER — TELEPHONE (OUTPATIENT)
Dept: RHEUMATOLOGY | Age: 70
End: 2025-02-26

## 2025-02-27 NOTE — TELEPHONE ENCOUNTER
Humira prior auth for pen injector:    Message from Plan  CaseId:[id number];Status:Approved;Review Type:Prior Auth;Coverage Start Date:01/27/2025;Coverage End Date:12/31/2025;. Authorization Expiration Date: December 31, 2025.

## 2025-03-20 ENCOUNTER — OFFICE VISIT (OUTPATIENT)
Dept: RHEUMATOLOGY | Age: 70
End: 2025-03-20
Payer: MEDICARE

## 2025-03-20 VITALS
BODY MASS INDEX: 43.88 KG/M2 | OXYGEN SATURATION: 97 % | HEIGHT: 61 IN | HEART RATE: 80 BPM | WEIGHT: 232.4 LBS | DIASTOLIC BLOOD PRESSURE: 70 MMHG | SYSTOLIC BLOOD PRESSURE: 104 MMHG

## 2025-03-20 DIAGNOSIS — M06.09 RHEUMATOID ARTHRITIS, SERONEGATIVE, MULTIPLE SITES (HCC): ICD-10-CM

## 2025-03-20 DIAGNOSIS — M06.09 RHEUMATOID ARTHRITIS, SERONEGATIVE, MULTIPLE SITES (HCC): Primary | ICD-10-CM

## 2025-03-20 DIAGNOSIS — Z79.899 LONG-TERM USE OF HIGH-RISK MEDICATION: ICD-10-CM

## 2025-03-20 DIAGNOSIS — M62.838 MUSCLE SPASM: ICD-10-CM

## 2025-03-20 LAB
ALBUMIN SERPL-MCNC: 4.1 G/DL (ref 3.2–4.6)
ALBUMIN/GLOB SERPL: 1.1 (ref 1–1.9)
ALP SERPL-CCNC: 73 U/L (ref 35–104)
ALT SERPL-CCNC: 27 U/L (ref 8–45)
ANION GAP SERPL CALC-SCNC: 14 MMOL/L (ref 7–16)
AST SERPL-CCNC: 26 U/L (ref 15–37)
BASOPHILS # BLD: 0.07 K/UL (ref 0–0.2)
BASOPHILS NFR BLD: 0.8 % (ref 0–2)
BILIRUB SERPL-MCNC: 0.4 MG/DL (ref 0–1.2)
BUN SERPL-MCNC: 10 MG/DL (ref 8–23)
CALCIUM SERPL-MCNC: 10.2 MG/DL (ref 8.8–10.2)
CHLORIDE SERPL-SCNC: 102 MMOL/L (ref 98–107)
CO2 SERPL-SCNC: 25 MMOL/L (ref 20–29)
CREAT SERPL-MCNC: 0.67 MG/DL (ref 0.6–1.1)
CRP SERPL-MCNC: <0.3 MG/DL (ref 0–0.4)
DIFFERENTIAL METHOD BLD: NORMAL
EOSINOPHIL # BLD: 0.71 K/UL (ref 0–0.8)
EOSINOPHIL NFR BLD: 7.8 % (ref 0.5–7.8)
ERYTHROCYTE [DISTWIDTH] IN BLOOD BY AUTOMATED COUNT: 12.5 % (ref 11.9–14.6)
GLOBULIN SER CALC-MCNC: 3.7 G/DL (ref 2.3–3.5)
GLUCOSE SERPL-MCNC: 108 MG/DL (ref 70–99)
HCT VFR BLD AUTO: 42.3 % (ref 35.8–46.3)
HGB BLD-MCNC: 13.6 G/DL (ref 11.7–15.4)
IMM GRANULOCYTES # BLD AUTO: 0.03 K/UL (ref 0–0.5)
IMM GRANULOCYTES NFR BLD AUTO: 0.3 % (ref 0–5)
LYMPHOCYTES # BLD: 2.85 K/UL (ref 0.5–4.6)
LYMPHOCYTES NFR BLD: 31.3 % (ref 13–44)
MCH RBC QN AUTO: 30.2 PG (ref 26.1–32.9)
MCHC RBC AUTO-ENTMCNC: 32.2 G/DL (ref 31.4–35)
MCV RBC AUTO: 93.8 FL (ref 82–102)
MONOCYTES # BLD: 0.64 K/UL (ref 0.1–1.3)
MONOCYTES NFR BLD: 7 % (ref 4–12)
NEUTS SEG # BLD: 4.8 K/UL (ref 1.7–8.2)
NEUTS SEG NFR BLD: 52.8 % (ref 43–78)
NRBC # BLD: 0 K/UL (ref 0–0.2)
PLATELET # BLD AUTO: 230 K/UL (ref 150–450)
PMV BLD AUTO: 10.4 FL (ref 9.4–12.3)
POTASSIUM SERPL-SCNC: 3.9 MMOL/L (ref 3.5–5.1)
PROT SERPL-MCNC: 7.8 G/DL (ref 6.3–8.2)
RBC # BLD AUTO: 4.51 M/UL (ref 4.05–5.2)
SODIUM SERPL-SCNC: 140 MMOL/L (ref 136–145)
WBC # BLD AUTO: 9.1 K/UL (ref 4.3–11.1)

## 2025-03-20 PROCEDURE — G8417 CALC BMI ABV UP PARAM F/U: HCPCS | Performed by: INTERNAL MEDICINE

## 2025-03-20 PROCEDURE — G8399 PT W/DXA RESULTS DOCUMENT: HCPCS | Performed by: INTERNAL MEDICINE

## 2025-03-20 PROCEDURE — G2211 COMPLEX E/M VISIT ADD ON: HCPCS | Performed by: INTERNAL MEDICINE

## 2025-03-20 PROCEDURE — G8427 DOCREV CUR MEDS BY ELIG CLIN: HCPCS | Performed by: INTERNAL MEDICINE

## 2025-03-20 PROCEDURE — 99214 OFFICE O/P EST MOD 30 MIN: CPT | Performed by: INTERNAL MEDICINE

## 2025-03-20 PROCEDURE — 1036F TOBACCO NON-USER: CPT | Performed by: INTERNAL MEDICINE

## 2025-03-20 PROCEDURE — 1123F ACP DISCUSS/DSCN MKR DOCD: CPT | Performed by: INTERNAL MEDICINE

## 2025-03-20 PROCEDURE — 1090F PRES/ABSN URINE INCON ASSESS: CPT | Performed by: INTERNAL MEDICINE

## 2025-03-20 PROCEDURE — 1160F RVW MEDS BY RX/DR IN RCRD: CPT | Performed by: INTERNAL MEDICINE

## 2025-03-20 PROCEDURE — 3017F COLORECTAL CA SCREEN DOC REV: CPT | Performed by: INTERNAL MEDICINE

## 2025-03-20 PROCEDURE — 1159F MED LIST DOCD IN RCRD: CPT | Performed by: INTERNAL MEDICINE

## 2025-03-20 RX ORDER — ADALIMUMAB 40MG/0.4ML
40 KIT SUBCUTANEOUS
Qty: 0.8 ML | Refills: 3 | Status: ACTIVE | OUTPATIENT
Start: 2025-03-20

## 2025-03-20 RX ORDER — FOLIC ACID 1 MG/1
1 TABLET ORAL DAILY
Qty: 90 TABLET | Refills: 1 | Status: SHIPPED | OUTPATIENT
Start: 2025-03-20

## 2025-03-20 ASSESSMENT — ROUTINE ASSESSMENT OF PATIENT INDEX DATA (RAPID3)
ON A SCALE OF ONE TO TEN, CONSIDERING ALL THE WAYS IN WHICH ILLNESS AND HEALTH CONDITIONS MAY AFFECT YOU AT THIS TIME, PLEASE INDICATE BELOW HOW YOU ARE DOING:: 4
ON A SCALE OF ONE TO TEN, HOW DIFFICULT WAS IT FOR YOU TO COMPLETE THE LISTED DAILY PHYSICAL TASKS OVER THE LAST WEEK: 0.4
ON A SCALE OF ONE TO TEN, HOW MUCH OF A PROBLEM HAS UNUSUAL FATIGUE OR TIREDNESS BEEN FOR YOU OVER THE PAST WEEK?: 2
ON A SCALE OF ONE TO TEN, HOW MUCH PAIN HAVE YOU HAD BECAUSE OF YOUR CONDITION OVER THE PAST WEEK?: 3
WHEN YOU AWAKENED IN THE MORNING OVER THE LAST WEEK, PLEASE INDICATE THE AMOUNT OF TIME IT TAKES UNTIL YOU ARE AS LIMBER AS YOU WILL BE FOR THE DAY: 45 MIN

## 2025-03-20 NOTE — PROGRESS NOTES
every 2 weeks while remaining on folic acid 1 mg once a day.  Patient is aware that if she is sick requiring her to be on an antibiotic or antiviral drug she will need to skip administering the Humira until she has completed the antibiotic/antiviral course and is over the infection.  -     folic acid (FOLVITE) 1 MG tablet; Take 1 tablet by mouth daily  -     adalimumab (HUMIRA, 2 PEN,) 40 MG/0.4ML QuickBloxKT injection pen kit; Inject 0.4 mLs into the skin every 14 days  -     C-Reactive Protein; Future    Muscle spasm: Patient was instructed to continue Flexeril 10 mg 1 pill to be taken 3 times a day as needed for muscle spasms.    Long-term use of high-risk medication: Lab results from the last visit were reviewed with the patient today.  If there is any noted abnormality from today's labs I will keep the patient informed but if not I will review her labs with her on her follow-up visit.  -     CBC with Auto Differential; Future  -     Comprehensive Metabolic Panel; Future    Disease activity plan:  As stated above.    Steroid management plan:  As stated above, if applicable.    Pain management plan:  As stated above, if applicable.    Weight management plan:  Weight loss through diet and exercise is always encouraged    Disease prognosis: Good    I appreciate the opportunity to continue to participate in the care of this patient.     Follow-up and Dispositions    Return in about 4 months (around 7/20/2025).       Electronically signed by:  Bhavana Sanchez MD      This note was dictated using dragon voice recognition software.  It has been proofread, but there may still exist voice recognition errors that the author did not detect.                --------------------------------------------------------------------------------------------------------------------------------------------------------------------------------------------------------------------------------

## 2025-07-24 ENCOUNTER — OFFICE VISIT (OUTPATIENT)
Dept: RHEUMATOLOGY | Age: 70
End: 2025-07-24
Payer: MEDICARE

## 2025-07-24 VITALS
BODY MASS INDEX: 44.14 KG/M2 | SYSTOLIC BLOOD PRESSURE: 92 MMHG | WEIGHT: 233.8 LBS | OXYGEN SATURATION: 96 % | DIASTOLIC BLOOD PRESSURE: 64 MMHG | HEART RATE: 89 BPM | HEIGHT: 61 IN

## 2025-07-24 DIAGNOSIS — M62.838 MUSCLE SPASM: ICD-10-CM

## 2025-07-24 DIAGNOSIS — M06.09 RHEUMATOID ARTHRITIS, SERONEGATIVE, MULTIPLE SITES (HCC): ICD-10-CM

## 2025-07-24 DIAGNOSIS — M06.09 RHEUMATOID ARTHRITIS, SERONEGATIVE, MULTIPLE SITES (HCC): Primary | ICD-10-CM

## 2025-07-24 DIAGNOSIS — Z11.1 SCREENING EXAMINATION FOR PULMONARY TUBERCULOSIS: ICD-10-CM

## 2025-07-24 DIAGNOSIS — Z79.899 LONG-TERM USE OF HIGH-RISK MEDICATION: ICD-10-CM

## 2025-07-24 LAB
ALBUMIN SERPL-MCNC: 4 G/DL (ref 3.2–4.6)
ALBUMIN/GLOB SERPL: 1.3 (ref 1–1.9)
ALP SERPL-CCNC: 64 U/L (ref 35–104)
ALT SERPL-CCNC: 29 U/L (ref 8–45)
ANION GAP SERPL CALC-SCNC: 13 MMOL/L (ref 7–16)
AST SERPL-CCNC: 22 U/L (ref 15–37)
BASOPHILS # BLD: 0.08 K/UL (ref 0–0.2)
BASOPHILS NFR BLD: 0.8 % (ref 0–2)
BILIRUB SERPL-MCNC: 0.4 MG/DL (ref 0–1.2)
BUN SERPL-MCNC: 21 MG/DL (ref 8–23)
CALCIUM SERPL-MCNC: 10.8 MG/DL (ref 8.8–10.2)
CHLORIDE SERPL-SCNC: 98 MMOL/L (ref 98–107)
CO2 SERPL-SCNC: 28 MMOL/L (ref 20–29)
CREAT SERPL-MCNC: 0.81 MG/DL (ref 0.6–1.1)
CRP SERPL-MCNC: <0.3 MG/DL (ref 0–0.4)
DIFFERENTIAL METHOD BLD: ABNORMAL
EOSINOPHIL # BLD: 0.83 K/UL (ref 0–0.8)
EOSINOPHIL NFR BLD: 7.9 % (ref 0.5–7.8)
ERYTHROCYTE [DISTWIDTH] IN BLOOD BY AUTOMATED COUNT: 13.2 % (ref 11.9–14.6)
GLOBULIN SER CALC-MCNC: 3.1 G/DL (ref 2.3–3.5)
GLUCOSE SERPL-MCNC: 107 MG/DL (ref 70–99)
HCT VFR BLD AUTO: 40.2 % (ref 35.8–46.3)
HGB BLD-MCNC: 13.5 G/DL (ref 11.7–15.4)
IMM GRANULOCYTES # BLD AUTO: 0.03 K/UL (ref 0–0.5)
IMM GRANULOCYTES NFR BLD AUTO: 0.3 % (ref 0–5)
LYMPHOCYTES # BLD: 3.16 K/UL (ref 0.5–4.6)
LYMPHOCYTES NFR BLD: 30.2 % (ref 13–44)
MCH RBC QN AUTO: 31.7 PG (ref 26.1–32.9)
MCHC RBC AUTO-ENTMCNC: 33.6 G/DL (ref 31.4–35)
MCV RBC AUTO: 94.4 FL (ref 82–102)
MONOCYTES # BLD: 0.72 K/UL (ref 0.1–1.3)
MONOCYTES NFR BLD: 6.9 % (ref 4–12)
NEUTS SEG # BLD: 5.63 K/UL (ref 1.7–8.2)
NEUTS SEG NFR BLD: 53.9 % (ref 43–78)
NRBC # BLD: 0 K/UL (ref 0–0.2)
PLATELET # BLD AUTO: 279 K/UL (ref 150–450)
PMV BLD AUTO: 10.4 FL (ref 9.4–12.3)
POTASSIUM SERPL-SCNC: 4.2 MMOL/L (ref 3.5–5.1)
PROT SERPL-MCNC: 7.2 G/DL (ref 6.3–8.2)
RBC # BLD AUTO: 4.26 M/UL (ref 4.05–5.2)
SODIUM SERPL-SCNC: 139 MMOL/L (ref 136–145)
WBC # BLD AUTO: 10.5 K/UL (ref 4.3–11.1)

## 2025-07-24 PROCEDURE — G2211 COMPLEX E/M VISIT ADD ON: HCPCS | Performed by: INTERNAL MEDICINE

## 2025-07-24 PROCEDURE — 1160F RVW MEDS BY RX/DR IN RCRD: CPT | Performed by: INTERNAL MEDICINE

## 2025-07-24 PROCEDURE — G8399 PT W/DXA RESULTS DOCUMENT: HCPCS | Performed by: INTERNAL MEDICINE

## 2025-07-24 PROCEDURE — 99214 OFFICE O/P EST MOD 30 MIN: CPT | Performed by: INTERNAL MEDICINE

## 2025-07-24 PROCEDURE — 1159F MED LIST DOCD IN RCRD: CPT | Performed by: INTERNAL MEDICINE

## 2025-07-24 PROCEDURE — G8417 CALC BMI ABV UP PARAM F/U: HCPCS | Performed by: INTERNAL MEDICINE

## 2025-07-24 PROCEDURE — 1090F PRES/ABSN URINE INCON ASSESS: CPT | Performed by: INTERNAL MEDICINE

## 2025-07-24 PROCEDURE — 3017F COLORECTAL CA SCREEN DOC REV: CPT | Performed by: INTERNAL MEDICINE

## 2025-07-24 PROCEDURE — G8427 DOCREV CUR MEDS BY ELIG CLIN: HCPCS | Performed by: INTERNAL MEDICINE

## 2025-07-24 PROCEDURE — 1123F ACP DISCUSS/DSCN MKR DOCD: CPT | Performed by: INTERNAL MEDICINE

## 2025-07-24 PROCEDURE — 1036F TOBACCO NON-USER: CPT | Performed by: INTERNAL MEDICINE

## 2025-07-24 RX ORDER — FOLIC ACID 1 MG/1
1 TABLET ORAL DAILY
Qty: 90 TABLET | Refills: 1 | Status: SHIPPED | OUTPATIENT
Start: 2025-07-24

## 2025-07-24 RX ORDER — CYCLOBENZAPRINE HCL 10 MG
TABLET ORAL
Qty: 270 TABLET | Refills: 1 | Status: SHIPPED | OUTPATIENT
Start: 2025-07-24

## 2025-07-24 RX ORDER — ADALIMUMAB 40MG/0.4ML
40 KIT SUBCUTANEOUS
Qty: 1.6 ML | Refills: 3 | Status: ACTIVE | OUTPATIENT
Start: 2025-07-24

## 2025-07-24 ASSESSMENT — ROUTINE ASSESSMENT OF PATIENT INDEX DATA (RAPID3)
WHEN YOU AWAKENED IN THE MORNING OVER THE LAST WEEK, PLEASE INDICATE THE AMOUNT OF TIME IT TAKES UNTIL YOU ARE AS LIMBER AS YOU WILL BE FOR THE DAY: 45 MIN
ON A SCALE OF ONE TO TEN, CONSIDERING ALL THE WAYS IN WHICH ILLNESS AND HEALTH CONDITIONS MAY AFFECT YOU AT THIS TIME, PLEASE INDICATE BELOW HOW YOU ARE DOING:: 5
ON A SCALE OF ONE TO TEN, HOW MUCH PAIN HAVE YOU HAD BECAUSE OF YOUR CONDITION OVER THE PAST WEEK?: 4
ON A SCALE OF ONE TO TEN, HOW MUCH OF A PROBLEM HAS UNUSUAL FATIGUE OR TIREDNESS BEEN FOR YOU OVER THE PAST WEEK?: 3
ON A SCALE OF ONE TO TEN, HOW DIFFICULT WAS IT FOR YOU TO COMPLETE THE LISTED DAILY PHYSICAL TASKS OVER THE LAST WEEK: 0.4

## 2025-07-24 ASSESSMENT — JOINT PAIN
TOTAL NUMBER OF TENDER JOINTS: 3
TOTAL NUMBER OF SWOLLEN JOINTS: 0

## 2025-07-24 NOTE — PROGRESS NOTES
[facility]  Bhavana Sanchez M.D.  [address] , [address]   [facility]  Office : [phone], Fax: [phone]     RHEUMATOLOGY OFFICE VISIT NOTE  Date of Visit:  2025 2:28 PM    Patient Information:  Name:  Zoya Torres  :  1955  Age:  70 y.o.   Gender:  female      Ms. Torres is here today for follow-up of RA, muscle spasms and medication monitoring.     Last visit: 25    History of Present Illness: On talking to the patient today she states that she has not had any cough, congestion, fever or chills secondary to seasonal allergies.  She has not had the need to skip administering the Humira for any reason since she last saw me.  Patient's current joint complaints are as mentioned below.    Since the last visit, patient is feeling \"very good\".    Pain: 4/10  Location: Some right hip and lower back pain. No groin pain. Some right knee pain with no buckling with some swelling and warmth but no redness. Bilateral thumb pain with swelling with no warmth and redness.   Quality:  Deep achy pain with some sharpness to it.   Modifying Factors:  Random times has pain. Ice helps.   Associated Symptoms:  No tingling, numbness or pain down the arms or legs.  No UE or LE weakness. No limitations with her ADL's.         2025     2:00 PM   DMARD/Biologic   AM Stiffness 45 min   Pain 4   Fatigue 3   MDHAQ 0.4   Patient Global Score 5   Medication Name Humira     Last TB screen: 24  TB result: Negative     Current dose of steroids: none  How long on current dose of steroids: NA  How long on continuous steroid therapy: NA      Past DMARDs, if applicable (methotrexate, plaquenil/hydroxychloroquine, sulfasalazine, Arava/leflunomide): Patient was on Plaquenil 200 mg twice a day and Methotrexate 7.5 mg weekly in the past but was ineffective after a while, hence has been off both the drugs for a few years now.      Past biologics, if applicable (enbrel,

## 2025-07-29 LAB
M TB IFN-G BLD-IMP: NEGATIVE
M TB IFN-G CD4+ T-CELLS BLD-ACNC: 0.14 IU/ML
M TBIFN-G CD4+ CD8+T-CELLS BLD-ACNC: 0.14 IU/ML
QUANTIFERON CRITERIA: NORMAL
QUANTIFERON MITOGEN VALUE: >10 IU/ML
QUANTIFERON NIL VALUE: 0.1 IU/ML

## 2025-07-31 ENCOUNTER — TELEPHONE (OUTPATIENT)
Dept: RHEUMATOLOGY | Age: 70
End: 2025-07-31

## 2025-07-31 NOTE — TELEPHONE ENCOUNTER
PA needed for Cyclobenzaprine 10 mg. PA approved- [facility].   Your request has been approved  CaseId:[id number];Status:Approved;Review Type:Prior Auth;Coverage Start Date:07/01/2025;Coverage End Date:07/31/2026;